# Patient Record
Sex: FEMALE | Race: BLACK OR AFRICAN AMERICAN | NOT HISPANIC OR LATINO | Employment: UNEMPLOYED | URBAN - METROPOLITAN AREA
[De-identification: names, ages, dates, MRNs, and addresses within clinical notes are randomized per-mention and may not be internally consistent; named-entity substitution may affect disease eponyms.]

---

## 2023-12-06 ENCOUNTER — APPOINTMENT (OUTPATIENT)
Dept: OBSTETRICS AND GYNECOLOGY | Facility: CLINIC | Age: 48
End: 2023-12-06
Payer: COMMERCIAL

## 2023-12-11 ENCOUNTER — APPOINTMENT (OUTPATIENT)
Dept: OBSTETRICS AND GYNECOLOGY | Facility: CLINIC | Age: 48
End: 2023-12-11
Payer: COMMERCIAL

## 2023-12-12 ENCOUNTER — OFFICE VISIT (OUTPATIENT)
Dept: OBSTETRICS AND GYNECOLOGY | Facility: CLINIC | Age: 48
End: 2023-12-12
Payer: COMMERCIAL

## 2023-12-12 ENCOUNTER — LAB (OUTPATIENT)
Dept: LAB | Facility: LAB | Age: 48
End: 2023-12-12
Payer: COMMERCIAL

## 2023-12-12 VITALS
SYSTOLIC BLOOD PRESSURE: 129 MMHG | BODY MASS INDEX: 41.1 KG/M2 | HEART RATE: 84 BPM | DIASTOLIC BLOOD PRESSURE: 80 MMHG | WEIGHT: 247 LBS

## 2023-12-12 DIAGNOSIS — Z01.419 WELL WOMAN EXAM WITH ROUTINE GYNECOLOGICAL EXAM: Primary | ICD-10-CM

## 2023-12-12 DIAGNOSIS — Z71.6 ENCOUNTER FOR SMOKING CESSATION COUNSELING: ICD-10-CM

## 2023-12-12 DIAGNOSIS — Z01.419 WELL WOMAN EXAM WITH ROUTINE GYNECOLOGICAL EXAM: ICD-10-CM

## 2023-12-12 DIAGNOSIS — N95.1 VAGINAL DRYNESS, MENOPAUSAL: ICD-10-CM

## 2023-12-12 PROBLEM — Z11.3 ENCOUNTER FOR SCREENING FOR INFECTIONS WITH PREDOMINANTLY SEXUAL MODE OF TRANSMISSION: Status: ACTIVE | Noted: 2023-12-12

## 2023-12-12 LAB
HBV SURFACE AG SERPL QL IA: NONREACTIVE
HCV AB SER QL: NONREACTIVE
HIV 1+2 AB+HIV1 P24 AG SERPL QL IA: NONREACTIVE
T PALLIDUM AB SER QL: NONREACTIVE

## 2023-12-12 PROCEDURE — 36415 COLL VENOUS BLD VENIPUNCTURE: CPT

## 2023-12-12 PROCEDURE — 87491 CHLMYD TRACH DNA AMP PROBE: CPT

## 2023-12-12 PROCEDURE — 86803 HEPATITIS C AB TEST: CPT

## 2023-12-12 PROCEDURE — 99396 PREV VISIT EST AGE 40-64: CPT

## 2023-12-12 PROCEDURE — 87591 N.GONORRHOEAE DNA AMP PROB: CPT

## 2023-12-12 PROCEDURE — 87205 SMEAR GRAM STAIN: CPT

## 2023-12-12 PROCEDURE — 87340 HEPATITIS B SURFACE AG IA: CPT

## 2023-12-12 PROCEDURE — 90471 IMMUNIZATION ADMIN: CPT | Mod: GC

## 2023-12-12 PROCEDURE — 87661 TRICHOMONAS VAGINALIS AMPLIF: CPT

## 2023-12-12 PROCEDURE — 99214 OFFICE O/P EST MOD 30 MIN: CPT | Mod: 25,GC

## 2023-12-12 PROCEDURE — 87389 HIV-1 AG W/HIV-1&-2 AB AG IA: CPT

## 2023-12-12 PROCEDURE — 86780 TREPONEMA PALLIDUM: CPT

## 2023-12-12 RX ORDER — ESTRADIOL 0.1 MG/G
CREAM VAGINAL
Qty: 34 G | Refills: 3 | Status: SHIPPED | OUTPATIENT
Start: 2023-12-12

## 2023-12-12 RX ORDER — MUPIROCIN 20 MG/G
OINTMENT TOPICAL
COMMUNITY
Start: 2020-11-05

## 2023-12-12 RX ORDER — SULFAMETHOXAZOLE AND TRIMETHOPRIM 800; 160 MG/1; MG/1
TABLET ORAL EVERY 12 HOURS
COMMUNITY
Start: 2023-09-14 | End: 2024-09-14

## 2023-12-12 RX ORDER — VARENICLINE TARTRATE 1 MG/1
1 TABLET, FILM COATED ORAL 2 TIMES DAILY
Qty: 60 TABLET | Refills: 2 | Status: SHIPPED | OUTPATIENT
Start: 2023-12-12 | End: 2024-03-12 | Stop reason: WASHOUT

## 2023-12-12 RX ORDER — DEXTROMETHORPHAN HYDROBROMIDE, GUAIFENESIN 5; 100 MG/5ML; MG/5ML
LIQUID ORAL
COMMUNITY

## 2023-12-12 RX ORDER — DEXTROSE MONOHYDRATE, SODIUM CHLORIDE, AND POTASSIUM CHLORIDE 50; 2.25; 2.24 G/1000ML; G/1000ML; G/1000ML
1 INJECTION, SOLUTION INTRAVENOUS DAILY
COMMUNITY

## 2023-12-12 RX ORDER — CHLORTHALIDONE 25 MG/1
25 TABLET ORAL DAILY
COMMUNITY

## 2023-12-12 RX ORDER — ATORVASTATIN CALCIUM 20 MG/1
TABLET, FILM COATED ORAL
COMMUNITY
Start: 2023-09-14 | End: 2024-09-12

## 2023-12-12 ASSESSMENT — ENCOUNTER SYMPTOMS
HEMATOLOGIC/LYMPHATIC NEGATIVE: 0
CARDIOVASCULAR NEGATIVE: 0
NEUROLOGICAL NEGATIVE: 0
PSYCHIATRIC NEGATIVE: 0
EYES NEGATIVE: 0
RESPIRATORY NEGATIVE: 0
GASTROINTESTINAL NEGATIVE: 0
MUSCULOSKELETAL NEGATIVE: 0
ENDOCRINE NEGATIVE: 0
ALLERGIC/IMMUNOLOGIC NEGATIVE: 0
CONSTITUTIONAL NEGATIVE: 0

## 2023-12-12 ASSESSMENT — PAIN SCALES - GENERAL: PAINLEVEL: 0-NO PAIN

## 2023-12-12 NOTE — ASSESSMENT & PLAN NOTE
- 1/2 ppd x 20 years, 10 pack-year history  - motivated to quit - chantix Rx'd  - 1800 QUIT NOW resource given   - plan to re-evaluate for HRT pending smoking cessation

## 2023-12-12 NOTE — PROGRESS NOTES
Sudha Ervin is a 48 y.o. female who is here for a routine exam. PCP = Dr. Bob ([facility])     HPI:   Reports vaginal dryness since hysterectomy 4 years ago, also with hot flashes. Sleep disturbances. Also reports frequent yeast infections.   Also worried about HS symptomatic in groin. Noticed one bump on L groin.     Past med hx and past surg hx reviewed and notable for:   - HTN, RA, hidradenitis suppurativa.   - S/p hysterectomy w/ oophorectomy (2019)   - 1x CS    OBHx: , 1x CS  GynHx: reports vaginal dryness, finds lube irritating to the skin. Surgical amenorrhea, reports h/o hysterectomy for AUB. Also reports h/o ovarian cysts, now s/p oophorectomy per patient.   Substance:  0.5 cigs/day  interested in quitting     Social History     Socioeconomic History    Marital status: Single     Spouse name: Not on file    Number of children: Not on file    Years of education: Not on file    Highest education level: Not on file   Occupational History    Not on file   Tobacco Use    Smoking status: Every Day     Packs/day: .5     Types: Cigarettes    Smokeless tobacco: Not on file   Substance and Sexual Activity    Alcohol use: Not on file    Drug use: Not on file    Sexual activity: Yes     Partners: Male     Birth control/protection: Surgical   Other Topics Concern    Not on file   Social History Narrative    Not on file     Social Determinants of Health     Financial Resource Strain: Not on file   Food Insecurity: Not on file   Transportation Needs: Not on file   Physical Activity: Not on file   Stress: Not on file   Social Connections: Not on file   Intimate Partner Violence: Not on file   Housing Stability: Not on file       Objective   /80   Pulse 84   Wt 112 kg (247 lb)   BMI 41.10 kg/m²      General:   Alert and oriented, in no acute distress   Neck: Supple. No visible thyromegaly.    Breast/Axilla: Normal to palpation bilaterally without masses, skin changes, or nipple discharge.    Abdomen: Soft,  non-tender, without masses or organomegaly   Vulva: Normal architecture without erythema, masses, or lesions. Somewhat prominent urethra. Small, 0.5cm nontender nodule on R lower vulva   Vagina: Pale-appearing mucosa without lesions, masses. Small amount of white discharge visible in vault.    Cervix: Surgically absent   Uterus: Surgically absent    Adnexa: Surgically absent   Pelvic Floor No POP noted.    Psych Normal affect. Normal mood.          No orders of the defined types were placed in this encounter.      Problem List Items Addressed This Visit       Well woman exam with routine gynecological exam - Primary    Current Assessment & Plan     - s/p hysterectomy and oophorectomy - pap not indicated  - endorsing menopausal symptoms - plan to reassess vasomotor treatment after smoking cessation  - mammogram ordered  - colonoscopy ordered         Relevant Orders    HIV 1/2 Antigen/Antibody Screen with Reflex to Confirmation    Syphilis Screen with Reflex    Hepatitis C Antibody    Hepatitis B Surface Antigen    Neisseria gonorrhoeae, Amplified    Chlamydia Trachomatis, Amplified    Trichomonas vaginalis, Nucleic Acid Detection    Vaginitis Gram Stain For Bacterial Vaginosis + Yeast    BI mammo bilateral screening tomosynthesis    Colonoscopy Screening; Average Risk Patient    Flu vaccine, high dose seasonal, preservative free    Encounter for smoking cessation counseling    Current Assessment & Plan     - 1/2 ppd x 20 years, 10 pack-year history  - motivated to quit - chantix Rx'd  - 1800 QUIT NOW resource given   - plan to re-evaluate for HRT pending smoking cessation          Relevant Medications    varenicline (Chantix) 1 mg tablet    Vaginal dryness, menopausal    Current Assessment & Plan     - estrogen cream Rx'd          Relevant Medications    estradiol (Estrace) 0.01 % (0.1 mg/gram) vaginal cream    Other Relevant Orders    Follow Up In Gynecology        Thank you for coming to your annual exam.     D/w  Dr. Antwan Sosa MD  PGY2, Obstetrics and Gynecology

## 2023-12-12 NOTE — ASSESSMENT & PLAN NOTE
- s/p hysterectomy and oophorectomy - pap not indicated  - likely folliculitis on vulvar exam - recommend f/u if increased size, drainage, or si/sx of infection   - vasomotor sx - plan to reassess for treatment after smoking cessation. Will initiate vaginal estrogen as below   - mammogram ordered  - colonoscopy ordered

## 2023-12-13 LAB
C TRACH RRNA SPEC QL NAA+PROBE: NEGATIVE
CLUE CELLS VAG LPF-#/AREA: PRESENT /[LPF]
N GONORRHOEA DNA SPEC QL PROBE+SIG AMP: NEGATIVE
NUGENT SCORE: 9
T VAGINALIS RRNA SPEC QL NAA+PROBE: NEGATIVE
YEAST VAG WET PREP-#/AREA: ABNORMAL

## 2023-12-17 DIAGNOSIS — N76.0 BV (BACTERIAL VAGINOSIS): Primary | ICD-10-CM

## 2023-12-17 DIAGNOSIS — B96.89 BV (BACTERIAL VAGINOSIS): Primary | ICD-10-CM

## 2023-12-17 RX ORDER — METRONIDAZOLE 500 MG/1
500 TABLET ORAL 2 TIMES DAILY
Qty: 14 TABLET | Refills: 0 | Status: SHIPPED | OUTPATIENT
Start: 2023-12-17 | End: 2023-12-24

## 2023-12-18 DIAGNOSIS — Z12.11 COLON CANCER SCREENING: Primary | ICD-10-CM

## 2023-12-18 RX ORDER — POLYETHYLENE GLYCOL 3350, SODIUM CHLORIDE, SODIUM BICARBONATE AND POTASSIUM CHLORIDE 420; 5.72; 11.2; 1.48 G/4L; G/4L; G/4L; G/4L
4000 POWDER, FOR SOLUTION ORAL ONCE
Qty: 4000 ML | Refills: 0 | Status: SHIPPED | OUTPATIENT
Start: 2023-12-18 | End: 2023-12-18

## 2023-12-19 ENCOUNTER — TELEPHONE (OUTPATIENT)
Dept: OBSTETRICS AND GYNECOLOGY | Facility: CLINIC | Age: 48
End: 2023-12-19
Payer: COMMERCIAL

## 2023-12-19 NOTE — TELEPHONE ENCOUNTER
----- Message from Susie Sosa MD sent at 12/17/2023  7:53 PM EST -----  Please inform this patient she has BV, and an Rx for flagyl has been sent to her pharmacy. Thanks!

## 2023-12-21 ENCOUNTER — HOSPITAL ENCOUNTER (OUTPATIENT)
Dept: RADIOLOGY | Facility: HOSPITAL | Age: 48
Discharge: HOME | End: 2023-12-21
Payer: COMMERCIAL

## 2023-12-21 DIAGNOSIS — Z01.419 WELL WOMAN EXAM WITH ROUTINE GYNECOLOGICAL EXAM: ICD-10-CM

## 2023-12-21 DIAGNOSIS — Z12.31 ENCOUNTER FOR SCREENING MAMMOGRAM FOR MALIGNANT NEOPLASM OF BREAST: ICD-10-CM

## 2023-12-21 PROCEDURE — 77063 BREAST TOMOSYNTHESIS BI: CPT | Performed by: RADIOLOGY

## 2023-12-21 PROCEDURE — 77067 SCR MAMMO BI INCL CAD: CPT | Performed by: RADIOLOGY

## 2023-12-21 PROCEDURE — 77067 SCR MAMMO BI INCL CAD: CPT

## 2024-03-12 ENCOUNTER — OFFICE VISIT (OUTPATIENT)
Dept: OBSTETRICS AND GYNECOLOGY | Facility: CLINIC | Age: 49
End: 2024-03-12
Payer: COMMERCIAL

## 2024-03-12 VITALS
BODY MASS INDEX: 39.81 KG/M2 | HEIGHT: 66 IN | HEART RATE: 84 BPM | DIASTOLIC BLOOD PRESSURE: 84 MMHG | SYSTOLIC BLOOD PRESSURE: 169 MMHG | WEIGHT: 247.7 LBS

## 2024-03-12 DIAGNOSIS — Z71.3 WEIGHT LOSS COUNSELING, ENCOUNTER FOR: ICD-10-CM

## 2024-03-12 DIAGNOSIS — Z71.6 ENCOUNTER FOR SMOKING CESSATION COUNSELING: ICD-10-CM

## 2024-03-12 DIAGNOSIS — N95.1 VAGINAL DRYNESS, MENOPAUSAL: Primary | ICD-10-CM

## 2024-03-12 DIAGNOSIS — E66.9 OBESITY (BMI 30-39.9): ICD-10-CM

## 2024-03-12 PROCEDURE — 99215 OFFICE O/P EST HI 40 MIN: CPT

## 2024-03-12 PROCEDURE — 99215 OFFICE O/P EST HI 40 MIN: CPT | Mod: GC

## 2024-03-12 RX ORDER — CYCLOBENZAPRINE HCL 10 MG
10 TABLET ORAL 3 TIMES DAILY
COMMUNITY
Start: 2024-02-27 | End: 2024-03-03 | Stop reason: WASHOUT

## 2024-03-12 RX ORDER — VARENICLINE TARTRATE 1 MG/1
1 TABLET, FILM COATED ORAL 2 TIMES DAILY
Qty: 60 TABLET | Refills: 2 | Status: SHIPPED | OUTPATIENT
Start: 2024-03-12 | End: 2024-06-10

## 2024-03-12 RX ORDER — DOCUSATE SODIUM 100 MG/1
100 CAPSULE, LIQUID FILLED ORAL EVERY 12 HOURS PRN
COMMUNITY
Start: 2024-02-27 | End: 2024-03-28

## 2024-03-12 ASSESSMENT — PAIN SCALES - GENERAL: PAINLEVEL: 3

## 2024-03-12 NOTE — ASSESSMENT & PLAN NOTE
- currently smoking 5 cigs/day   - reviewed benefits of smoking cessation, including in wound healing now that she is s/p surgery. Also reviewed safety of chantix, especially given she is having no adverse side effects.  - chantix Rx'd previously, pt reports has been helping her quit. Refill sent today.

## 2024-03-12 NOTE — ASSESSMENT & PLAN NOTE
- reviewed dietary recommendations, exercise recommendations  - pt currently walking for exercise  - [employer] referral given

## 2024-03-12 NOTE — PROGRESS NOTES
Subjective   Patient ID: Sudha Ervin is a 48 y.o. female who presents for Follow-up (Pt here to follow up from visit 12/23/Reports no vaginal issues or concerns at this time).  HPI    49yo F presenting for FUV     Since last visit 12/2023 -   - vaginal dryness - vag estrogen Rx'd. Was using a few times per week with improvement in symptoms. Not currently using due to ongoing perirectal abscess recovery.   - menopausal sx - reports hot flashes a few times per week, overall able to manage symptoms well.   - smoking cessation - chantix Rx'd. Pt reports assisted with cravings when she was taking, stopped taking because she was nervous to drive while taking chantix.     Pt just got out of hospital from appendectomy, perirectal abscess, recovering well. Pendrose drain to be removed tomorrow. Pain mostly well controlled.     Also interested in weight loss. Has tried fasting for weeks without weight loss. Has also been walking daily.    PMH notable for HTN, on hydrochlorothiazide; did not take meds today.       Objective   Visit Vitals  /84   Pulse 84      Physical Exam  General: no acute distress  HEENT: normocephalic, atraumatic  Heart: warm and well perfused  Lungs: no increased WOB  Abd: soft  Extremities: moving all extremities  Neuro: awake and conversant  Psych: appropriate mood and affect     Assessment/Plan   Problem List Items Addressed This Visit             ICD-10-CM    Encounter for smoking cessation counseling Z71.6     - currently smoking 5 cigs/day   - reviewed benefits of smoking cessation, including in wound healing now that she is s/p surgery. Also reviewed safety of chantix, especially given she is having no adverse side effects.  - chantix Rx'd previously, pt reports has been helping her quit. Refill sent today.         Relevant Medications    varenicline (Chantix) 1 mg tablet    Vaginal dryness, menopausal - Primary N95.1     - reviewed estrogen cream usage - improved symptoms. Plan to  continue taking  - reviewed additional menopausal symptoms and treatment options - pt declines at this time         Obesity (BMI 30-39.9) E66.9     - reviewed dietary recommendations, exercise recommendations  - pt currently walking for exercise  - [employer] referral given          Relevant Orders    Referral to [facility]    Weight loss counseling, encounter for Z71.3     RTC 1 yr for annual      Seen and d/w Dr. Antwna Sosa MD 03/12/24 9:15 AM

## 2024-03-12 NOTE — PROGRESS NOTES
I saw and examined the patient.  I personally obtained the key and critical portions of the history and physical exam, and was physically present for the key and critical portions performed by the resident.  I reviewed the resident's documentation and discussed the patient with the resident.  I agreed with the resident's medical decision-making as documented in the resident's note.  Corinne Bazella MD Corinne A Bazella, MD

## 2024-03-12 NOTE — ASSESSMENT & PLAN NOTE
- reviewed estrogen cream usage - improved symptoms. Plan to continue taking  - reviewed additional menopausal symptoms and treatment options - pt declines at this time

## 2024-09-19 ENCOUNTER — OFFICE VISIT (OUTPATIENT)
Dept: OBSTETRICS AND GYNECOLOGY | Facility: CLINIC | Age: 49
End: 2024-09-19
Payer: COMMERCIAL

## 2024-09-19 VITALS
HEART RATE: 84 BPM | SYSTOLIC BLOOD PRESSURE: 158 MMHG | WEIGHT: 247.3 LBS | BODY MASS INDEX: 41.2 KG/M2 | DIASTOLIC BLOOD PRESSURE: 81 MMHG | HEIGHT: 65 IN

## 2024-09-19 DIAGNOSIS — Z12.31 BREAST CANCER SCREENING BY MAMMOGRAM: ICD-10-CM

## 2024-09-19 DIAGNOSIS — N89.8 VAGINAL DISCHARGE: Primary | ICD-10-CM

## 2024-09-19 PROCEDURE — 3008F BODY MASS INDEX DOCD: CPT | Performed by: STUDENT IN AN ORGANIZED HEALTH CARE EDUCATION/TRAINING PROGRAM

## 2024-09-19 PROCEDURE — 99213 OFFICE O/P EST LOW 20 MIN: CPT | Mod: GC | Performed by: STUDENT IN AN ORGANIZED HEALTH CARE EDUCATION/TRAINING PROGRAM

## 2024-09-19 PROCEDURE — 87661 TRICHOMONAS VAGINALIS AMPLIF: CPT | Performed by: STUDENT IN AN ORGANIZED HEALTH CARE EDUCATION/TRAINING PROGRAM

## 2024-09-19 PROCEDURE — 99213 OFFICE O/P EST LOW 20 MIN: CPT | Performed by: STUDENT IN AN ORGANIZED HEALTH CARE EDUCATION/TRAINING PROGRAM

## 2024-09-19 PROCEDURE — 87491 CHLMYD TRACH DNA AMP PROBE: CPT | Performed by: STUDENT IN AN ORGANIZED HEALTH CARE EDUCATION/TRAINING PROGRAM

## 2024-09-19 PROCEDURE — 87205 SMEAR GRAM STAIN: CPT | Performed by: STUDENT IN AN ORGANIZED HEALTH CARE EDUCATION/TRAINING PROGRAM

## 2024-09-19 ASSESSMENT — ENCOUNTER SYMPTOMS
CONSTITUTIONAL NEGATIVE: 0
HEMATOLOGIC/LYMPHATIC NEGATIVE: 0
GASTROINTESTINAL NEGATIVE: 0
ENDOCRINE NEGATIVE: 0
CARDIOVASCULAR NEGATIVE: 0
PSYCHIATRIC NEGATIVE: 0
ALLERGIC/IMMUNOLOGIC NEGATIVE: 0
EYES NEGATIVE: 0
RESPIRATORY NEGATIVE: 0
MUSCULOSKELETAL NEGATIVE: 0
NEUROLOGICAL NEGATIVE: 0

## 2024-09-19 ASSESSMENT — PATIENT HEALTH QUESTIONNAIRE - PHQ9
SUM OF ALL RESPONSES TO PHQ9 QUESTIONS 1 AND 2: 0
1. LITTLE INTEREST OR PLEASURE IN DOING THINGS: NOT AT ALL
2. FEELING DOWN, DEPRESSED OR HOPELESS: NOT AT ALL

## 2024-09-19 ASSESSMENT — PAIN SCALES - GENERAL: PAINLEVEL: 0-NO PAIN

## 2024-09-19 NOTE — PROGRESS NOTES
I saw and evaluated the patient. I personally obtained the key and critical portions of the history and physical exam or was physically present for key and critical portions performed by the resident/fellow. I reviewed the resident/fellow's documentation and discussed the patient with the resident/fellow. I agree with the resident/fellow's medical decision making as documented in the note.    Keke Murguia MD

## 2024-09-19 NOTE — PROGRESS NOTES
OBGYN Annual Visit    Sudha Ervin is a 48 y.o.  presenting for vaginal discharge    HPI  Patient states that she got treated for a sprained ankle in the setting of her arthiritis and was given prednisone and since then she has had foul smelling discharge and some vulvar itching, otherwise no vb and is sp hysterectomy. She also takes vaginal estrogen for vaginal dryness which has improved her symptoms.    PMH notable for HTN, on hydrochlorothiazide     OB History    Para Term  AB Living   6 1       1   SAB IAB Ectopic Multiple Live Births                  # Outcome Date GA Lbr Alfred/2nd Weight Sex Type Anes PTL Lv   6             5             4             3             2             1 Para      CS-LTranv          Past Medical History:   Diagnosis Date    Hidradenitis suppurativa     Hypertension     Other specified acute skin changes due to ultraviolet radiation     Photodermatitis    Personal history of other diseases of the musculoskeletal system and connective tissue     History of rheumatoid arthritis    Sciatica, unspecified side     Sciatic pain       Past Surgical History:   Procedure Laterality Date    OTHER SURGICAL HISTORY  2021    Cholecystectomy    OTHER SURGICAL HISTORY  2019    Hysterectomy    OTHER SURGICAL HISTORY  2021    Ectopic pregnancy removal    OTHER SURGICAL HISTORY  2022    Esophagogastroduodenoscopy    OTHER SURGICAL HISTORY  2022    Colonoscopy       Family History   Problem Relation Name Age of Onset    Breast cancer Maternal Grandmother  40 - 49       Social History     Socioeconomic History    Marital status: Single     Spouse name: Not on file    Number of children: Not on file    Years of education: Not on file    Highest education level: Not on file   Occupational History    Not on file   Tobacco Use    Smoking status: Every Day     Current packs/day: 0.50     Types: Cigarettes    Smokeless tobacco: Never    Substance and Sexual Activity    Alcohol use: Yes     Comment: Social drinking    Drug use: Yes     Types: Marijuana    Sexual activity: Yes     Partners: Male     Birth control/protection: Surgical   Other Topics Concern    Not on file   Social History Narrative    Not on file     Social Determinants of Health     Financial Resource Strain: Low Risk  (2/26/2024)    Received from [facility]    Overall Financial Resource Strain (CARDIA)     Difficulty of Paying Living Expenses: Not very hard   Food Insecurity: No Food Insecurity (2/26/2024)    Received from [facility]    Hunger Vital Sign     Worried About Running Out of Food in the Last Year: Never true     Ran Out of Food in the Last Year: Never true   Transportation Needs: No Transportation Needs (2/26/2024)    Received from [facility]    PRAPARE - Transportation     Lack of Transportation (Medical): No     Lack of Transportation (Non-Medical): No   Physical Activity: Not on file   Stress: Not on file   Social Connections: Not on file   Intimate Partner Violence: Not on file   Housing Stability: Not on file         Objective   Visit Vitals  /81   Pulse 84         General:   Alert and oriented, in no acute distress   Neck: Supple. No visible thyromegaly.            Vulva: Normal architecture without erythema, masses, or lesions.    Vagina: Normal mucosa without lesions, masses, or atrophy. White and clumpy discharge in vault   Cervix: Surgically absent           Pelvic Floor No POP noted. No high tone pelvic floor    Psych Normal affect. Normal mood.      ASSESSEMENT AND PLAN    Vaginal Discharge  - white clumpy vaginal discharge resembling yeast but in setting of vaginal estrogen use will wait for swabs to come back  - GCCT and Trich also obtained per patient request     Preventative Care  - mammogram scheduled, last wnl 2023      Awais Zaidi, PGY4 OBGYN  Seen and discussed with   Blade

## 2024-09-20 LAB
C TRACH RRNA SPEC QL NAA+PROBE: NEGATIVE
N GONORRHOEA DNA SPEC QL PROBE+SIG AMP: NEGATIVE
T VAGINALIS RRNA SPEC QL NAA+PROBE: NEGATIVE

## 2024-09-23 DIAGNOSIS — B96.89 BACTERIAL VAGINOSIS: Primary | ICD-10-CM

## 2024-09-23 DIAGNOSIS — N76.0 BACTERIAL VAGINOSIS: Primary | ICD-10-CM

## 2024-09-23 LAB
CLUE CELLS VAG LPF-#/AREA: PRESENT /[LPF]
NUGENT SCORE: 9
YEAST VAG WET PREP-#/AREA: ABNORMAL

## 2024-09-23 RX ORDER — METRONIDAZOLE 7.5 MG/G
GEL VAGINAL DAILY
Qty: 70 G | Refills: 0 | Status: SHIPPED | OUTPATIENT
Start: 2024-09-23 | End: 2024-09-28

## 2025-06-11 ENCOUNTER — APPOINTMENT (OUTPATIENT)
Dept: RADIOLOGY | Facility: HOSPITAL | Age: 50
End: 2025-06-11
Payer: COMMERCIAL

## 2025-06-11 ENCOUNTER — HOSPITAL ENCOUNTER (INPATIENT)
Facility: HOSPITAL | Age: 50
LOS: 4 days | Discharge: HOME | End: 2025-06-15
Attending: STUDENT IN AN ORGANIZED HEALTH CARE EDUCATION/TRAINING PROGRAM | Admitting: INTERNAL MEDICINE
Payer: COMMERCIAL

## 2025-06-11 ENCOUNTER — CLINICAL SUPPORT (OUTPATIENT)
Dept: EMERGENCY MEDICINE | Facility: HOSPITAL | Age: 50
End: 2025-06-11
Payer: COMMERCIAL

## 2025-06-11 DIAGNOSIS — I10 HYPERTENSION, UNSPECIFIED TYPE: ICD-10-CM

## 2025-06-11 DIAGNOSIS — K59.00 CONSTIPATION, UNSPECIFIED CONSTIPATION TYPE: ICD-10-CM

## 2025-06-11 DIAGNOSIS — K85.90 ACUTE PANCREATITIS, UNSPECIFIED COMPLICATION STATUS, UNSPECIFIED PANCREATITIS TYPE (HHS-HCC): Primary | ICD-10-CM

## 2025-06-11 DIAGNOSIS — R11.2 NAUSEA AND VOMITING, UNSPECIFIED VOMITING TYPE: ICD-10-CM

## 2025-06-11 LAB
ALBUMIN SERPL BCP-MCNC: 3.7 G/DL (ref 3.4–5)
ALP SERPL-CCNC: 73 U/L (ref 33–110)
ALT SERPL W P-5'-P-CCNC: 10 U/L (ref 7–45)
ANION GAP SERPL CALC-SCNC: 14 MMOL/L (ref 10–20)
APPEARANCE UR: CLEAR
AST SERPL W P-5'-P-CCNC: 11 U/L (ref 9–39)
BASOPHILS # BLD AUTO: 0.03 X10*3/UL (ref 0–0.1)
BASOPHILS NFR BLD AUTO: 0.5 %
BILIRUB SERPL-MCNC: 0.5 MG/DL (ref 0–1.2)
BILIRUB UR STRIP.AUTO-MCNC: NEGATIVE MG/DL
BUN SERPL-MCNC: 6 MG/DL (ref 6–23)
CALCIUM SERPL-MCNC: 9 MG/DL (ref 8.6–10.6)
CARDIAC TROPONIN I PNL SERPL HS: 8 NG/L (ref 0–34)
CHLORIDE SERPL-SCNC: 100 MMOL/L (ref 98–107)
CHOLEST SERPL-MCNC: 234 MG/DL (ref 0–199)
CHOLESTEROL/HDL RATIO: 4.6
CO2 SERPL-SCNC: 27 MMOL/L (ref 21–32)
COLOR UR: ABNORMAL
CREAT SERPL-MCNC: 0.75 MG/DL (ref 0.5–1.05)
EGFRCR SERPLBLD CKD-EPI 2021: >90 ML/MIN/1.73M*2
EOSINOPHIL # BLD AUTO: 0.1 X10*3/UL (ref 0–0.7)
EOSINOPHIL NFR BLD AUTO: 1.5 %
ERYTHROCYTE [DISTWIDTH] IN BLOOD BY AUTOMATED COUNT: 13.4 % (ref 11.5–14.5)
ETHANOL SERPL-MCNC: <10 MG/DL
GLUCOSE SERPL-MCNC: 103 MG/DL (ref 74–99)
GLUCOSE UR STRIP.AUTO-MCNC: NORMAL MG/DL
HCT VFR BLD AUTO: 41.2 % (ref 36–46)
HDLC SERPL-MCNC: 50.8 MG/DL
HGB BLD-MCNC: 14.3 G/DL (ref 12–16)
IMM GRANULOCYTES # BLD AUTO: 0.02 X10*3/UL (ref 0–0.7)
IMM GRANULOCYTES NFR BLD AUTO: 0.3 % (ref 0–0.9)
KETONES UR STRIP.AUTO-MCNC: NEGATIVE MG/DL
LACTATE SERPL-SCNC: 0.9 MMOL/L (ref 0.4–2)
LDLC SERPL CALC-MCNC: 159 MG/DL
LEUKOCYTE ESTERASE UR QL STRIP.AUTO: NEGATIVE
LIPASE SERPL-CCNC: 72 U/L (ref 9–82)
LYMPHOCYTES # BLD AUTO: 2.52 X10*3/UL (ref 1.2–4.8)
LYMPHOCYTES NFR BLD AUTO: 38.9 %
MCH RBC QN AUTO: 30.6 PG (ref 26–34)
MCHC RBC AUTO-ENTMCNC: 34.7 G/DL (ref 32–36)
MCV RBC AUTO: 88 FL (ref 80–100)
MONOCYTES # BLD AUTO: 0.59 X10*3/UL (ref 0.1–1)
MONOCYTES NFR BLD AUTO: 9.1 %
NEUTROPHILS # BLD AUTO: 3.21 X10*3/UL (ref 1.2–7.7)
NEUTROPHILS NFR BLD AUTO: 49.7 %
NITRITE UR QL STRIP.AUTO: NEGATIVE
NON HDL CHOLESTEROL: 183 MG/DL (ref 0–149)
NRBC BLD-RTO: 0 /100 WBCS (ref 0–0)
PH UR STRIP.AUTO: 7 [PH]
PLATELET # BLD AUTO: 304 X10*3/UL (ref 150–450)
POTASSIUM SERPL-SCNC: 3.4 MMOL/L (ref 3.5–5.3)
PREGNANCY TEST URINE, POC: NEGATIVE
PROT SERPL-MCNC: 7.9 G/DL (ref 6.4–8.2)
PROT UR STRIP.AUTO-MCNC: ABNORMAL MG/DL
RBC # BLD AUTO: 4.67 X10*6/UL (ref 4–5.2)
RBC # UR STRIP.AUTO: NEGATIVE MG/DL
RBC #/AREA URNS AUTO: NORMAL /HPF
SODIUM SERPL-SCNC: 138 MMOL/L (ref 136–145)
SP GR UR STRIP.AUTO: >1.05
SQUAMOUS #/AREA URNS AUTO: NORMAL /HPF
TRIGL SERPL-MCNC: 123 MG/DL (ref 0–149)
UROBILINOGEN UR STRIP.AUTO-MCNC: NORMAL MG/DL
VLDL: 25 MG/DL (ref 0–40)
WBC # BLD AUTO: 6.5 X10*3/UL (ref 4.4–11.3)
WBC #/AREA URNS AUTO: NORMAL /HPF

## 2025-06-11 PROCEDURE — 81025 URINE PREGNANCY TEST: CPT

## 2025-06-11 PROCEDURE — 71046 X-RAY EXAM CHEST 2 VIEWS: CPT

## 2025-06-11 PROCEDURE — 99223 1ST HOSP IP/OBS HIGH 75: CPT

## 2025-06-11 PROCEDURE — 96374 THER/PROPH/DIAG INJ IV PUSH: CPT

## 2025-06-11 PROCEDURE — 2500000001 HC RX 250 WO HCPCS SELF ADMINISTERED DRUGS (ALT 637 FOR MEDICARE OP): Mod: SE

## 2025-06-11 PROCEDURE — 93010 ELECTROCARDIOGRAM REPORT: CPT

## 2025-06-11 PROCEDURE — 2550000001 HC RX 255 CONTRASTS: Mod: SE

## 2025-06-11 PROCEDURE — 99285 EMERGENCY DEPT VISIT HI MDM: CPT

## 2025-06-11 PROCEDURE — 80061 LIPID PANEL: CPT

## 2025-06-11 PROCEDURE — 96375 TX/PRO/DX INJ NEW DRUG ADDON: CPT

## 2025-06-11 PROCEDURE — 99285 EMERGENCY DEPT VISIT HI MDM: CPT | Mod: 25 | Performed by: STUDENT IN AN ORGANIZED HEALTH CARE EDUCATION/TRAINING PROGRAM

## 2025-06-11 PROCEDURE — 2500000004 HC RX 250 GENERAL PHARMACY W/ HCPCS (ALT 636 FOR OP/ED): Mod: SE

## 2025-06-11 PROCEDURE — 1210000001 HC SEMI-PRIVATE ROOM DAILY

## 2025-06-11 PROCEDURE — 71046 X-RAY EXAM CHEST 2 VIEWS: CPT | Performed by: RADIOLOGY

## 2025-06-11 PROCEDURE — 84484 ASSAY OF TROPONIN QUANT: CPT

## 2025-06-11 PROCEDURE — 93005 ELECTROCARDIOGRAM TRACING: CPT

## 2025-06-11 PROCEDURE — 81001 URINALYSIS AUTO W/SCOPE: CPT

## 2025-06-11 PROCEDURE — 85025 COMPLETE CBC W/AUTO DIFF WBC: CPT

## 2025-06-11 PROCEDURE — 74177 CT ABD & PELVIS W/CONTRAST: CPT | Mod: FOREIGN READ | Performed by: RADIOLOGY

## 2025-06-11 PROCEDURE — 36415 COLL VENOUS BLD VENIPUNCTURE: CPT

## 2025-06-11 PROCEDURE — 80053 COMPREHEN METABOLIC PANEL: CPT

## 2025-06-11 PROCEDURE — 74177 CT ABD & PELVIS W/CONTRAST: CPT

## 2025-06-11 PROCEDURE — 83690 ASSAY OF LIPASE: CPT

## 2025-06-11 PROCEDURE — 83605 ASSAY OF LACTIC ACID: CPT

## 2025-06-11 PROCEDURE — 82077 ASSAY SPEC XCP UR&BREATH IA: CPT

## 2025-06-11 RX ORDER — ONDANSETRON HYDROCHLORIDE 2 MG/ML
4 INJECTION, SOLUTION INTRAVENOUS EVERY 8 HOURS PRN
Status: DISCONTINUED | OUTPATIENT
Start: 2025-06-11 | End: 2025-06-15 | Stop reason: HOSPADM

## 2025-06-11 RX ORDER — MORPHINE SULFATE 4 MG/ML
4 INJECTION INTRAVENOUS ONCE
Status: COMPLETED | OUTPATIENT
Start: 2025-06-11 | End: 2025-06-11

## 2025-06-11 RX ORDER — ESOMEPRAZOLE MAGNESIUM 40 MG/1
40 GRANULE, DELAYED RELEASE ORAL
Status: DISCONTINUED | OUTPATIENT
Start: 2025-06-12 | End: 2025-06-12

## 2025-06-11 RX ORDER — SODIUM CHLORIDE 9 MG/ML
100 INJECTION, SOLUTION INTRAVENOUS CONTINUOUS
Status: DISCONTINUED | OUTPATIENT
Start: 2025-06-11 | End: 2025-06-12

## 2025-06-11 RX ORDER — ACETAMINOPHEN 160 MG/5ML
650 SOLUTION ORAL EVERY 4 HOURS PRN
Status: DISCONTINUED | OUTPATIENT
Start: 2025-06-11 | End: 2025-06-12

## 2025-06-11 RX ORDER — ENOXAPARIN SODIUM 100 MG/ML
40 INJECTION SUBCUTANEOUS EVERY 12 HOURS SCHEDULED
Status: DISCONTINUED | OUTPATIENT
Start: 2025-06-11 | End: 2025-06-15 | Stop reason: HOSPADM

## 2025-06-11 RX ORDER — FAMOTIDINE 10 MG/ML
20 INJECTION, SOLUTION INTRAVENOUS EVERY 12 HOURS PRN
Status: DISCONTINUED | OUTPATIENT
Start: 2025-06-11 | End: 2025-06-12

## 2025-06-11 RX ORDER — ONDANSETRON HYDROCHLORIDE 2 MG/ML
4 INJECTION, SOLUTION INTRAVENOUS ONCE
Status: COMPLETED | OUTPATIENT
Start: 2025-06-11 | End: 2025-06-11

## 2025-06-11 RX ORDER — ONDANSETRON 4 MG/1
4 TABLET, ORALLY DISINTEGRATING ORAL EVERY 8 HOURS PRN
Status: DISCONTINUED | OUTPATIENT
Start: 2025-06-11 | End: 2025-06-15 | Stop reason: HOSPADM

## 2025-06-11 RX ORDER — ATORVASTATIN CALCIUM 20 MG/1
20 TABLET, FILM COATED ORAL NIGHTLY
Status: DISCONTINUED | OUTPATIENT
Start: 2025-06-11 | End: 2025-06-11

## 2025-06-11 RX ORDER — PANTOPRAZOLE SODIUM 40 MG/1
40 TABLET, DELAYED RELEASE ORAL
COMMUNITY

## 2025-06-11 RX ORDER — POTASSIUM CHLORIDE 1.5 G/1.58G
20 POWDER, FOR SOLUTION ORAL DAILY
Status: ON HOLD | COMMUNITY
End: 2025-06-12 | Stop reason: WASHOUT

## 2025-06-11 RX ORDER — ACETAMINOPHEN 650 MG/1
650 SUPPOSITORY RECTAL EVERY 4 HOURS PRN
Status: DISCONTINUED | OUTPATIENT
Start: 2025-06-11 | End: 2025-06-12

## 2025-06-11 RX ORDER — QUETIAPINE FUMARATE 50 MG/1
50 TABLET, FILM COATED ORAL 2 TIMES DAILY
COMMUNITY

## 2025-06-11 RX ORDER — POTASSIUM CHLORIDE 14.9 MG/ML
20 INJECTION INTRAVENOUS
Status: COMPLETED | OUTPATIENT
Start: 2025-06-11 | End: 2025-06-11

## 2025-06-11 RX ORDER — OXYCODONE HYDROCHLORIDE 5 MG/1
5 TABLET ORAL EVERY 6 HOURS PRN
Status: DISCONTINUED | OUTPATIENT
Start: 2025-06-11 | End: 2025-06-12

## 2025-06-11 RX ORDER — ACETAMINOPHEN 325 MG/1
650 TABLET ORAL EVERY 4 HOURS PRN
Status: DISCONTINUED | OUTPATIENT
Start: 2025-06-11 | End: 2025-06-12

## 2025-06-11 RX ORDER — CHLORTHALIDONE 25 MG/1
25 TABLET ORAL DAILY
Status: DISCONTINUED | OUTPATIENT
Start: 2025-06-11 | End: 2025-06-11

## 2025-06-11 RX ORDER — FAMOTIDINE 10 MG/ML
20 INJECTION, SOLUTION INTRAVENOUS ONCE
Status: COMPLETED | OUTPATIENT
Start: 2025-06-11 | End: 2025-06-11

## 2025-06-11 RX ORDER — HYDROCHLOROTHIAZIDE 25 MG/1
25 TABLET ORAL DAILY
COMMUNITY
End: 2025-06-15 | Stop reason: HOSPADM

## 2025-06-11 RX ORDER — PANTOPRAZOLE SODIUM 40 MG/1
40 TABLET, DELAYED RELEASE ORAL
Status: DISCONTINUED | OUTPATIENT
Start: 2025-06-12 | End: 2025-06-15 | Stop reason: HOSPADM

## 2025-06-11 RX ORDER — PANTOPRAZOLE SODIUM 40 MG/10ML
40 INJECTION, POWDER, LYOPHILIZED, FOR SOLUTION INTRAVENOUS
Status: DISCONTINUED | OUTPATIENT
Start: 2025-06-12 | End: 2025-06-12

## 2025-06-11 RX ADMIN — ONDANSETRON 4 MG: 2 INJECTION INTRAMUSCULAR; INTRAVENOUS at 08:25

## 2025-06-11 RX ADMIN — POTASSIUM CHLORIDE 20 MEQ: 14.9 INJECTION, SOLUTION INTRAVENOUS at 16:59

## 2025-06-11 RX ADMIN — ONDANSETRON 4 MG: 4 TABLET, ORALLY DISINTEGRATING ORAL at 18:28

## 2025-06-11 RX ADMIN — FAMOTIDINE 20 MG: 10 INJECTION INTRAVENOUS at 08:25

## 2025-06-11 RX ADMIN — SODIUM CHLORIDE 100 ML/HR: 0.9 INJECTION, SOLUTION INTRAVENOUS at 15:13

## 2025-06-11 RX ADMIN — POTASSIUM CHLORIDE 20 MEQ: 14.9 INJECTION, SOLUTION INTRAVENOUS at 15:13

## 2025-06-11 RX ADMIN — OXYCODONE 5 MG: 5 TABLET ORAL at 18:28

## 2025-06-11 RX ADMIN — IOHEXOL 80 ML: 350 INJECTION, SOLUTION INTRAVENOUS at 12:46

## 2025-06-11 RX ADMIN — ENOXAPARIN SODIUM 40 MG: 100 INJECTION SUBCUTANEOUS at 15:46

## 2025-06-11 RX ADMIN — MORPHINE SULFATE 4 MG: 4 INJECTION INTRAVENOUS at 14:05

## 2025-06-11 ASSESSMENT — PAIN SCALES - GENERAL
PAINLEVEL_OUTOF10: 7
PAINLEVEL_OUTOF10: 10 - WORST POSSIBLE PAIN
PAINLEVEL_OUTOF10: 9
PAINLEVEL_OUTOF10: 0 - NO PAIN
PAINLEVEL_OUTOF10: 10 - WORST POSSIBLE PAIN

## 2025-06-11 ASSESSMENT — PAIN - FUNCTIONAL ASSESSMENT
PAIN_FUNCTIONAL_ASSESSMENT: 0-10

## 2025-06-11 ASSESSMENT — PAIN DESCRIPTION - DESCRIPTORS
DESCRIPTORS: ACHING
DESCRIPTORS: ACHING

## 2025-06-11 ASSESSMENT — PAIN DESCRIPTION - ORIENTATION: ORIENTATION: ANTERIOR

## 2025-06-11 ASSESSMENT — PAIN DESCRIPTION - PAIN TYPE: TYPE: ACUTE PAIN

## 2025-06-11 ASSESSMENT — PAIN DESCRIPTION - LOCATION: LOCATION: CHEST

## 2025-06-11 NOTE — PROGRESS NOTES
Pharmacy Medication History Review    Sudha Ervin is a 49 y.o. female admitted for Acute pancreatitis, unspecified complication status, unspecified pancreatitis type ([facility]-HCC). Pharmacy reviewed the patient's [id number] medications and allergies for accuracy.    The list below reflects the updated PTA list.   Prior to Admission Medications   Prescriptions Last Dose Informant   QUEtiapine (SEROquel) 50 mg tablet Past Week Self   Sig: Take 1 tablet (50 mg) by mouth 2 times a day.   acetaminophen (Tylenol 8 HOUR) 650 mg ER tablet 6/10/2025 Self   Sig: Take 2 tablets (1,300 mg) by mouth every 8 hours if needed for mild pain (1 - 3) or moderate pain (4 - 6).   atorvastatin (Lipitor) 10 mg tablet Not Taking    Sig: Take 1 tablet (10 mg) by mouth once daily.   Patient not taking: Reported on 6/11/2025   chlorthalidone (Hygroton) 25 mg tablet Past Week Self   Sig: Take 1 tablet (25 mg) by mouth once daily.   estradiol (Estrace) 0.01 % (0.1 mg/gram) vaginal cream Not Taking Self   Sig: Use 1g nightly for two weeks. Then continue to use at bedtime twice a week   Patient not taking: Reported on 6/11/2025   hydroCHLOROthiazide (HYDRODiuril) 25 mg tablet Not Taking Self   Sig: Take 1 tablet (25 mg) by mouth once daily.   Patient not taking: Reported on 6/11/2025   linaCLOtide (Linzess) 145 mcg capsule Past Week Self   Sig: Take 1 capsule (145 mcg) by mouth once daily in the morning. Take before meals. At 6 AM   pantoprazole (ProtoNix) 40 mg EC tablet Past Week Self   Sig: Take 1 tablet (40 mg) by mouth once daily in the morning. Take before meals. Do not crush, chew, or split.   potassium chloride (Klor-Con) 20 mEq packet Not Taking Self   Sig: Take 20 mEq by mouth once daily.   Patient not taking: Reported on 6/11/2025   varenicline (Chantix) 1 mg tablet Not Taking    Sig: Take 1 tablet (1 mg) by mouth 2 times a day. Take with a full glass of water   Patient not taking: Reported on 6/11/2025       Facility-Administered Medications: None        The list below reflects the updated allergy list. Please review each documented allergy for additional clarification and justification.  Allergies  Reviewed by Sedrick Steven IV RN on 6/11/2025        Severity Reactions Comments    Clindamycin Not Specified Swelling, Unknown             Patient accepts M2B at discharge. Pharmacy has been updated to [facility].    Sources used to complete the med history include:    [facility]  Pharmacy dispense history  Patient Interview Good historian  Chart Review  Care Everywhere   Office visit 4/15/25 GI Shira Maria C    Below are additional concerns with the patient's PTA list.      Medications ADDED:  Linzess     Medications CHANGED:  None   Medications REMOVED:   Atorvastatin (not taking)  Estradiol (not taking)  Hydrochlorothiazide (not taking)  Potassium (not taking)  Chantix (not taking)    [facility]el[facility].   Transitions of Care Pharmacist    Please reach out via Secure Chat for questions, or if no response call [facility] or [facility]

## 2025-06-11 NOTE — H&P
History Of Present Illness  Sudha Ervin is a 49 y.o. female presenting with PMMH of HTN, GERD, IBS, [place], presented today complaining of epigastic abdominal pain radiate to the the back, the pain started after the patient was sick 2 weeks ago, she started to have flu like symptoms two weeks ago when the pain started. The pain is sever and associated with nausea and vomiting, pt reported chronic abdominal pain in the RLQ this pain is not the same, pt with hx of appendectomy a year ago,cholecystectomy 10 years ago. In the ED HDS, labs showed normal bilirubin, normal LFTs, no leukocytosis. Pt reported chest pain, troponin was negative and EKG NSR with no ischemic changes. Chest xray is negative for acute cardiopulmonary process and CT scan showed pancreatitis. Patient was admitted to medicine for further evaluation and management.       3/2022 EGD   Impression:            - Normal hypopharynx.                          - Normal esophagus.                          - Z-line regular, 40 cm from the incisors.                          - A large amount of food (residue) in the stomach.                          - Gastritis. Biopsied.                          - Retained food in the duodenum.      3/2022 Colonoscopy   Impression:            - Preparation of the colon was inadequate.                          - Hemorrhoids found on perianal exam.                          - Perianal skin tags found on perianal exam.                          - Stool in the rectum and in the sigmoid colon.                          - No specimens collected.       SHELLY pt was seen by GI on 4/15 for the abdominal pain (RLQ with constipation)   Was prescribed One time dose of Magnesium Citrate 10 oz, followed by 8 oz water, Start Linzess 145 mcg once daily for constipation, Start pantoprazole 40 mg once daily and was scheduled for EGD and colonoscopy.       Past Medical History  She has a past medical history of Hidradenitis suppurativa,  Hypertension, Other specified acute skin changes due to ultraviolet radiation, Personal history of other diseases of the musculoskeletal system and connective tissue, and Sciatica, unspecified side.    Surgical History  She has a past surgical history that includes Other surgical history (07/28/2021); Other surgical history (2019); Other surgical history (07/28/2021); Other surgical history (01/06/2022); and Other surgical history (01/06/2022).     Social History  She reports that she has been smoking cigarettes. She has never used smokeless tobacco. She reports current alcohol use. She reports current drug use. Drug: Marijuana.    Family History  Family History[1]     Allergies  Clindamycin    Review of Systems   Per HPI  Physical Exam   Constitutional: Patient does not appear to be in any acute distress, AOx4  HEENT: NCAT, normal external inspection of ears and nose. Oropharynx normal.  Cardio: RRR, S1/S2, no murmurs, rubs, or gallops, radial pulses +2, no edema of extremities  Pulm: CTAB, no respiratory distress.  GI:epigastric tenderness    MSK: No joint swelling, normal movements of all extremities. Normal ROM, 5/5 strength  Skin: No lesions, contusions, or erythema.  Extremities: no BLE swelling   Psych: Appropriate mood and behavior    Last Recorded Vitals  /65 (BP Location: Left arm, Patient Position: Sitting)   Pulse 65   Temp 36.8 °C (98.2 °F) (Tympanic)   Resp 18   Wt 112 kg (247 lb)   SpO2 98%     Relevant Results  Results for orders placed or performed during the hospital encounter of 06/11/25 (from the past 24 hours)   CBC and Auto Differential   Result Value Ref Range    WBC 6.5 4.4 - 11.3 x10*3/uL    nRBC 0.0 0.0 - 0.0 /100 WBCs    RBC 4.67 4.00 - 5.20 x10*6/uL    Hemoglobin 14.3 12.0 - 16.0 g/dL    Hematocrit 41.2 36.0 - 46.0 %    MCV 88 80 - 100 fL    MCH 30.6 26.0 - 34.0 pg    MCHC 34.7 32.0 - 36.0 g/dL    RDW 13.4 11.5 - 14.5 %    Platelets 304 150 - 450 x10*3/uL    Neutrophils % 49.7  40.0 - 80.0 %    Immature Granulocytes %, Automated 0.3 0.0 - 0.9 %    Lymphocytes % 38.9 13.0 - 44.0 %    Monocytes % 9.1 2.0 - 10.0 %    Eosinophils % 1.5 0.0 - 6.0 %    Basophils % 0.5 0.0 - 2.0 %    Neutrophils Absolute 3.21 1.20 - 7.70 x10*3/uL    Immature Granulocytes Absolute, Automated 0.02 0.00 - 0.70 x10*3/uL    Lymphocytes Absolute 2.52 1.20 - 4.80 x10*3/uL    Monocytes Absolute 0.59 0.10 - 1.00 x10*3/uL    Eosinophils Absolute 0.10 0.00 - 0.70 x10*3/uL    Basophils Absolute 0.03 0.00 - 0.10 x10*3/uL   Comprehensive metabolic panel   Result Value Ref Range    Glucose 103 (H) 74 - 99 mg/dL    Sodium 138 136 - 145 mmol/L    Potassium 3.4 (L) 3.5 - 5.3 mmol/L    Chloride 100 98 - 107 mmol/L    Bicarbonate 27 21 - 32 mmol/L    Anion Gap 14 10 - 20 mmol/L    Urea Nitrogen 6 6 - 23 mg/dL    Creatinine 0.75 0.50 - 1.05 mg/dL    eGFR >90 >60 mL/min/1.73m*2    Calcium 9.0 8.6 - 10.6 mg/dL    Albumin 3.7 3.4 - 5.0 g/dL    Alkaline Phosphatase 73 33 - 110 U/L    Total Protein 7.9 6.4 - 8.2 g/dL    AST 11 9 - 39 U/L    Bilirubin, Total 0.5 0.0 - 1.2 mg/dL    ALT 10 7 - 45 U/L   Lipase   Result Value Ref Range    Lipase 72 9 - 82 U/L   Lactate   Result Value Ref Range    Lactate 0.9 0.4 - 2.0 mmol/L   Troponin I, High Sensitivity   Result Value Ref Range    Troponin I, High Sensitivity (CMC) 8 0 - 34 ng/L             Assessment/Plan   49 y.o. female presenting with PMMH of HTN, GERD, IBS, [place], presented today complaining of two weeks of epigastric abdominal pain radiate to the the back, associated with nausea and vomiting, pt reported chronic abdominal pain in the RLQ this pain is not the same, pt with hx of appendectomy a year ago,cholecystectomy 10 years ago. In the ED HDS, labs showed normal bilirubin, normal LFTs, no leukocytosis. Pt reported chest pain, troponin was negative and EKG NSR with no ischemic changes. Chest xray is negative for acute cardiopulmonary process and CT scan showed pancreatitis. Patient was  admitted to medicine fas a case of acute pancreatitis.     #Acute pancreatitis   #Acute on chronic abdominal pain   #Nausea and vomiting   #Irritable bowel syndrome  #GERD   -pt vitally stable  -labs with normal bilirubin and LFTS ,lipase normal  -calcium is normal   -pt s/p cholecystectomy~10 years ago   -pt reported chronic abdominal pain diagnosed with IBS. No improvement in her pain, GI scheduled EGD and colonoscopy   -pt reported that she does not drink alcohol regularly   -no clear source for pancreatitis   Plan  -admit to medicine   -IV fluid  ml/hr  -Lipid profile pending   -hold chlorthalidone given it can cause pancreatitis (rare side effect and the patient on this medications since 2018) however given no clear reason for the pancreatitis will hold for now  -hold atorvastatin   -famotidine and Zofran for nausea  -pantoprazole for GERD symptoms   -tylenol and oxycodone PRN for pain  -no antibiotics, no fever or leukocytosis   -consider GI/rheumatology consult inpatient vs outpatient pt reported some joint pain.   -blood alcohol pending     F:PRN  E:PRN  N:advance as tolerated  A:PIV  Code status: Full code       Assessment & Plan  Acute pancreatitis, unspecified complication status, unspecified pancreatitis type ([facility])           Viraj Ford MD         [1]   Family History  Problem Relation Name Age of Onset    Breast cancer Maternal Grandmother  40 - 49

## 2025-06-11 NOTE — ED TRIAGE NOTES
Pt presents to the ED for complaints of flu like symptoms that started about 2 weeks ago. Pt states she had a viral illness about 2 weeks and states she has no felt any better since then. Pt states that she has developed central chest pain 4 days ago and that this pain now radiated to her back. Pt states she is now also unable to keep anything down and also has a productive cough. Pt has PMHX of rheumatoid arthritis and IBS. Pt states she also has a history of HTN but hasn't been able to take her medicine in about a week because she is unable to keep anything down

## 2025-06-11 NOTE — ED PROVIDER NOTES
HPI   Chief Complaint   Patient presents with    Flu Symptoms       49-year-old female PMH HTN, GERD, IBS, BARRINGTON presents ED for chief complaints of flulike symptoms.  Patient reports that around 2 weeks ago they had a viral URI states that they do not feel any better since this occurred.  Also reports having chest pain that developed around 4 days ago.  Also reporting productive cough as well as nausea, vomiting.  Reports that her chest pain is worsened with cough.    Denies any sore throat, runny nose, congestion, urinary symptoms, hematemesis, hemoptysis, hematochezia.              Patient History   Medical History[1]  Surgical History[2]  Family History[3]  Social History[4]    Physical Exam   ED Triage Vitals   Temperature Heart Rate Respirations BP   06/11/25 0756 06/11/25 0756 06/11/25 0756 06/11/25 0800   36.8 °C (98.2 °F) 68 18 (!) 178/95      Pulse Ox Temp Source Heart Rate Source Patient Position   06/11/25 0756 06/11/25 0756 06/11/25 0756 06/11/25 0756   97 % Tympanic Monitor Sitting      BP Location FiO2 (%)     06/11/25 0756 --     Left arm        Physical Exam  Vitals and nursing note reviewed.   Constitutional:       General: She is not in acute distress.     Appearance: Normal appearance. She is normal weight. She is not ill-appearing or toxic-appearing.   HENT:      Head: Normocephalic and atraumatic.   Cardiovascular:      Rate and Rhythm: Normal rate and regular rhythm.      Heart sounds: Normal heart sounds. No murmur heard.     No friction rub. No gallop.   Pulmonary:      Effort: No respiratory distress.      Breath sounds: Normal breath sounds. No stridor. No wheezing, rhonchi or rales.   Chest:      Chest wall: Tenderness present.   Abdominal:      General: Abdomen is flat. There is no distension.      Palpations: Abdomen is soft. There is no mass.      Tenderness: There is abdominal tenderness. There is no guarding or rebound.      Hernia: No hernia is present.   Neurological:      Mental  Status: She is alert.           ED Course & MDM   Diagnoses as of 06/11/25 1436   Acute pancreatitis, unspecified complication status, unspecified pancreatitis type ([facility])   Nausea and vomiting, unspecified vomiting type                 No data recorded     Kelly Coma Scale Score: 15 (06/11/25 0754 : Sedrick Steven IV, RN)                           Medical Decision Making  49-year-old female presents ED chief complaints of nausea, vomiting, abdominal pain, cough, shortness of breath, chest pain.  Reports that she had a URI about 2 weeks ago but still not feeling any better.  On exam has reproducible chest pain.  Reports her chest pain is worsened with deep breathing.  Also midepigastric as well as diffuse abdominal tenderness on exam.  Vital signs however are reassuring.  Given patient's abdominal tenderness, chest pain will obtain labs as well as chest x-ray, EKG, CT abdomen pelvis.  Patient will be medicated for pain here in the emergency department and reevaluated.    2:36 PM Labs overall unremarkable apart from mild hypokalemia 3.4.  Remainder of electrolytes within normal limits.  Lipase, troponin and lactate within normal limits.  CBC no significant leukocytosis or chest x-ray unremarkable.    However, CT scan did show findings consistent with mild acute pancreatitis involving the head and uncinate process of the pancreas.  Given that this patient has a history of cholecystectomy and that they still report that they do not regularly drink alcohol and given that their lipase is within normal limits unclear etiology as to cause of pancreatitis at this time.  Patient reports minimal symptomatic improvement throughout the ED however, does not report any further episodes of emesis.    Given this finding to speak with admission service regarding this patient who did accept this patient.  Patient admitted to medicine service for further management and monitoring.    6:57 PM patient signed out to EAN  Jeanine pending transfer to admission floor.    Amount and/or Complexity of Data Reviewed  ECG/medicine tests:      Details: EKG showing normal sinus rhythm with sinus arrhythmia 70 bpm.  .  QRS 6 8.  QTc 438.  No acute ST changes.        Procedure  Procedures         [1]   Past Medical History:  Diagnosis Date    Hidradenitis suppurativa     Hypertension     Other specified acute skin changes due to ultraviolet radiation     Photodermatitis    Personal history of other diseases of the musculoskeletal system and connective tissue     History of rheumatoid arthritis    Sciatica, unspecified side     Sciatic pain   [2]   Past Surgical History:  Procedure Laterality Date    OTHER SURGICAL HISTORY  07/28/2021    Cholecystectomy    OTHER SURGICAL HISTORY  2019    Hysterectomy    OTHER SURGICAL HISTORY  07/28/2021    Ectopic pregnancy removal    OTHER SURGICAL HISTORY  01/06/2022    Esophagogastroduodenoscopy    OTHER SURGICAL HISTORY  01/06/2022    Colonoscopy   [3]   Family History  Problem Relation Name Age of Onset    Breast cancer Maternal Grandmother  40 - 49   [4]   Social History  Tobacco Use    Smoking status: Every Day     Current packs/day: 0.50     Types: Cigarettes    Smokeless tobacco: Never   Substance Use Topics    Alcohol use: Yes     Comment: Social drinking    Drug use: Yes     Types: Marijuana        Johnathan Barksdale PA-C  06/11/25 6484

## 2025-06-12 LAB
ALBUMIN SERPL BCP-MCNC: 3.4 G/DL (ref 3.4–5)
ALP SERPL-CCNC: 68 U/L (ref 33–110)
ALT SERPL W P-5'-P-CCNC: 8 U/L (ref 7–45)
ANION GAP SERPL CALC-SCNC: 12 MMOL/L (ref 10–20)
AST SERPL W P-5'-P-CCNC: 9 U/L (ref 9–39)
ATRIAL RATE: 70 BPM
BILIRUB SERPL-MCNC: 0.6 MG/DL (ref 0–1.2)
BUN SERPL-MCNC: 5 MG/DL (ref 6–23)
CALCIUM SERPL-MCNC: 8.9 MG/DL (ref 8.6–10.6)
CHLORIDE SERPL-SCNC: 101 MMOL/L (ref 98–107)
CO2 SERPL-SCNC: 27 MMOL/L (ref 21–32)
CREAT SERPL-MCNC: 0.72 MG/DL (ref 0.5–1.05)
EGFRCR SERPLBLD CKD-EPI 2021: >90 ML/MIN/1.73M*2
ERYTHROCYTE [DISTWIDTH] IN BLOOD BY AUTOMATED COUNT: 13.6 % (ref 11.5–14.5)
GLUCOSE SERPL-MCNC: 99 MG/DL (ref 74–99)
HCT VFR BLD AUTO: 41 % (ref 36–46)
HGB BLD-MCNC: 12.9 G/DL (ref 12–16)
HOLD SPECIMEN: NORMAL
MCH RBC QN AUTO: 29.9 PG (ref 26–34)
MCHC RBC AUTO-ENTMCNC: 31.5 G/DL (ref 32–36)
MCV RBC AUTO: 95 FL (ref 80–100)
NRBC BLD-RTO: 0 /100 WBCS (ref 0–0)
P AXIS: 68 DEGREES
P OFFSET: 194 MS
P ONSET: 145 MS
PLATELET # BLD AUTO: 272 X10*3/UL (ref 150–450)
POTASSIUM SERPL-SCNC: 3.5 MMOL/L (ref 3.5–5.3)
PR INTERVAL: 154 MS
PROT SERPL-MCNC: 6.7 G/DL (ref 6.4–8.2)
Q ONSET: 222 MS
QRS COUNT: 11 BEATS
QRS DURATION: 68 MS
QT INTERVAL: 406 MS
QTC CALCULATION(BAZETT): 438 MS
QTC FREDERICIA: 427 MS
R AXIS: 47 DEGREES
RBC # BLD AUTO: 4.31 X10*6/UL (ref 4–5.2)
SODIUM SERPL-SCNC: 136 MMOL/L (ref 136–145)
T AXIS: 55 DEGREES
T OFFSET: 425 MS
VENTRICULAR RATE: 70 BPM
WBC # BLD AUTO: 6.1 X10*3/UL (ref 4.4–11.3)

## 2025-06-12 PROCEDURE — 99233 SBSQ HOSP IP/OBS HIGH 50: CPT | Performed by: INTERNAL MEDICINE

## 2025-06-12 PROCEDURE — 2500000004 HC RX 250 GENERAL PHARMACY W/ HCPCS (ALT 636 FOR OP/ED): Mod: SE | Performed by: INTERNAL MEDICINE

## 2025-06-12 PROCEDURE — 80053 COMPREHEN METABOLIC PANEL: CPT

## 2025-06-12 PROCEDURE — 85027 COMPLETE CBC AUTOMATED: CPT

## 2025-06-12 PROCEDURE — 2500000001 HC RX 250 WO HCPCS SELF ADMINISTERED DRUGS (ALT 637 FOR MEDICARE OP): Mod: SE | Performed by: INTERNAL MEDICINE

## 2025-06-12 PROCEDURE — 2500000004 HC RX 250 GENERAL PHARMACY W/ HCPCS (ALT 636 FOR OP/ED): Mod: SE

## 2025-06-12 PROCEDURE — 1210000001 HC SEMI-PRIVATE ROOM DAILY

## 2025-06-12 PROCEDURE — 2500000002 HC RX 250 W HCPCS SELF ADMINISTERED DRUGS (ALT 637 FOR MEDICARE OP, ALT 636 FOR OP/ED): Mod: SE | Performed by: INTERNAL MEDICINE

## 2025-06-12 PROCEDURE — 2500000001 HC RX 250 WO HCPCS SELF ADMINISTERED DRUGS (ALT 637 FOR MEDICARE OP): Mod: SE

## 2025-06-12 PROCEDURE — 36415 COLL VENOUS BLD VENIPUNCTURE: CPT

## 2025-06-12 RX ORDER — AMLODIPINE BESYLATE 5 MG/1
5 TABLET ORAL DAILY
Status: DISCONTINUED | OUTPATIENT
Start: 2025-06-12 | End: 2025-06-15 | Stop reason: HOSPADM

## 2025-06-12 RX ORDER — SODIUM CHLORIDE, SODIUM LACTATE, POTASSIUM CHLORIDE, CALCIUM CHLORIDE 600; 310; 30; 20 MG/100ML; MG/100ML; MG/100ML; MG/100ML
125 INJECTION, SOLUTION INTRAVENOUS CONTINUOUS
Status: ACTIVE | OUTPATIENT
Start: 2025-06-12 | End: 2025-06-13

## 2025-06-12 RX ORDER — OXYCODONE HYDROCHLORIDE 5 MG/1
5 TABLET ORAL EVERY 4 HOURS PRN
Refills: 0 | Status: DISCONTINUED | OUTPATIENT
Start: 2025-06-12 | End: 2025-06-14

## 2025-06-12 RX ORDER — ACETAMINOPHEN 325 MG/1
975 TABLET ORAL EVERY 8 HOURS
Status: DISCONTINUED | OUTPATIENT
Start: 2025-06-12 | End: 2025-06-15 | Stop reason: HOSPADM

## 2025-06-12 RX ORDER — KETOROLAC TROMETHAMINE 30 MG/ML
30 INJECTION, SOLUTION INTRAMUSCULAR; INTRAVENOUS EVERY 6 HOURS PRN
Status: DISCONTINUED | OUTPATIENT
Start: 2025-06-12 | End: 2025-06-15 | Stop reason: HOSPADM

## 2025-06-12 RX ORDER — QUETIAPINE FUMARATE 25 MG/1
50 TABLET, FILM COATED ORAL 2 TIMES DAILY
Status: DISCONTINUED | OUTPATIENT
Start: 2025-06-12 | End: 2025-06-14

## 2025-06-12 RX ORDER — HYDRALAZINE HYDROCHLORIDE 20 MG/ML
10 INJECTION INTRAMUSCULAR; INTRAVENOUS EVERY 6 HOURS PRN
Status: DISCONTINUED | OUTPATIENT
Start: 2025-06-12 | End: 2025-06-15 | Stop reason: HOSPADM

## 2025-06-12 RX ADMIN — ENOXAPARIN SODIUM 40 MG: 100 INJECTION SUBCUTANEOUS at 08:43

## 2025-06-12 RX ADMIN — OXYCODONE 5 MG: 5 TABLET ORAL at 06:25

## 2025-06-12 RX ADMIN — SODIUM CHLORIDE, POTASSIUM CHLORIDE, SODIUM LACTATE AND CALCIUM CHLORIDE 125 ML/HR: 600; 310; 30; 20 INJECTION, SOLUTION INTRAVENOUS at 23:20

## 2025-06-12 RX ADMIN — SODIUM CHLORIDE, POTASSIUM CHLORIDE, SODIUM LACTATE AND CALCIUM CHLORIDE 125 ML/HR: 600; 310; 30; 20 INJECTION, SOLUTION INTRAVENOUS at 08:43

## 2025-06-12 RX ADMIN — ACETAMINOPHEN 650 MG: 325 TABLET ORAL at 00:03

## 2025-06-12 RX ADMIN — OXYCODONE 5 MG: 5 TABLET ORAL at 11:04

## 2025-06-12 RX ADMIN — OXYCODONE 5 MG: 5 TABLET ORAL at 15:26

## 2025-06-12 RX ADMIN — ENOXAPARIN SODIUM 40 MG: 100 INJECTION SUBCUTANEOUS at 02:43

## 2025-06-12 RX ADMIN — ONDANSETRON 4 MG: 2 INJECTION INTRAMUSCULAR; INTRAVENOUS at 06:59

## 2025-06-12 RX ADMIN — AMLODIPINE BESYLATE 5 MG: 5 TABLET ORAL at 15:26

## 2025-06-12 RX ADMIN — QUETIAPINE FUMARATE 50 MG: 25 TABLET ORAL at 20:15

## 2025-06-12 RX ADMIN — HYDRALAZINE HYDROCHLORIDE 10 MG: 20 INJECTION INTRAMUSCULAR; INTRAVENOUS at 15:26

## 2025-06-12 RX ADMIN — PANTOPRAZOLE SODIUM 40 MG: 40 TABLET, DELAYED RELEASE ORAL at 06:26

## 2025-06-12 RX ADMIN — ENOXAPARIN SODIUM 40 MG: 100 INJECTION SUBCUTANEOUS at 20:15

## 2025-06-12 RX ADMIN — OXYCODONE 5 MG: 5 TABLET ORAL at 19:56

## 2025-06-12 SDOH — ECONOMIC STABILITY: FOOD INSECURITY: HOW HARD IS IT FOR YOU TO PAY FOR THE VERY BASICS LIKE FOOD, HOUSING, MEDICAL CARE, AND HEATING?: NOT VERY HARD

## 2025-06-12 SDOH — SOCIAL STABILITY: SOCIAL NETWORK: HOW OFTEN DO YOU GET TOGETHER WITH FRIENDS OR RELATIVES?: PATIENT DECLINED

## 2025-06-12 SDOH — HEALTH STABILITY: PHYSICAL HEALTH: ON AVERAGE, HOW MANY DAYS PER WEEK DO YOU ENGAGE IN MODERATE TO STRENUOUS EXERCISE (LIKE A BRISK WALK)?: 2 DAYS

## 2025-06-12 SDOH — SOCIAL STABILITY: SOCIAL INSECURITY: WITHIN THE LAST YEAR, HAVE YOU BEEN HUMILIATED OR EMOTIONALLY ABUSED IN OTHER WAYS BY YOUR PARTNER OR EX-PARTNER?: NO

## 2025-06-12 SDOH — ECONOMIC STABILITY: INCOME INSECURITY: IN THE PAST 12 MONTHS HAS THE ELECTRIC, GAS, OIL, OR WATER COMPANY THREATENED TO SHUT OFF SERVICES IN YOUR HOME?: NO

## 2025-06-12 SDOH — ECONOMIC STABILITY: HOUSING INSECURITY: IN THE LAST 12 MONTHS, WAS THERE A TIME WHEN YOU WERE NOT ABLE TO PAY THE MORTGAGE OR RENT ON TIME?: YES

## 2025-06-12 SDOH — HEALTH STABILITY: PHYSICAL HEALTH
HOW OFTEN DO YOU NEED TO HAVE SOMEONE HELP YOU WHEN YOU READ INSTRUCTIONS, PAMPHLETS, OR OTHER WRITTEN MATERIAL FROM YOUR DOCTOR OR PHARMACY?: NEVER

## 2025-06-12 SDOH — SOCIAL STABILITY: SOCIAL NETWORK: HOW OFTEN DO YOU ATTEND MEETINGS OF THE CLUBS OR ORGANIZATIONS YOU BELONG TO?: PATIENT DECLINED

## 2025-06-12 SDOH — ECONOMIC STABILITY: FOOD INSECURITY: WITHIN THE PAST 12 MONTHS, THE FOOD YOU BOUGHT JUST DIDN'T LAST AND YOU DIDN'T HAVE MONEY TO GET MORE.: NEVER TRUE

## 2025-06-12 SDOH — SOCIAL STABILITY: SOCIAL INSECURITY: ARE YOU MARRIED, WIDOWED, DIVORCED, SEPARATED, NEVER MARRIED, OR LIVING WITH A PARTNER?: PATIENT DECLINED

## 2025-06-12 SDOH — SOCIAL STABILITY: SOCIAL INSECURITY: WITHIN THE LAST YEAR, HAVE YOU BEEN AFRAID OF YOUR PARTNER OR EX-PARTNER?: NO

## 2025-06-12 SDOH — SOCIAL STABILITY: SOCIAL NETWORK
DO YOU BELONG TO ANY CLUBS OR ORGANIZATIONS SUCH AS CHURCH GROUPS, UNIONS, FRATERNAL OR ATHLETIC GROUPS, OR SCHOOL GROUPS?: PATIENT DECLINED

## 2025-06-12 SDOH — SOCIAL STABILITY: SOCIAL INSECURITY
WITHIN THE LAST YEAR, HAVE YOU BEEN KICKED, HIT, SLAPPED, OR OTHERWISE PHYSICALLY HURT BY YOUR PARTNER OR EX-PARTNER?: NO

## 2025-06-12 SDOH — ECONOMIC STABILITY: FOOD INSECURITY: WITHIN THE PAST 12 MONTHS, YOU WORRIED THAT YOUR FOOD WOULD RUN OUT BEFORE YOU GOT THE MONEY TO BUY MORE.: NEVER TRUE

## 2025-06-12 SDOH — HEALTH STABILITY: MENTAL HEALTH
DO YOU FEEL STRESS - TENSE, RESTLESS, NERVOUS, OR ANXIOUS, OR UNABLE TO SLEEP AT NIGHT BECAUSE YOUR MIND IS TROUBLED ALL THE TIME - THESE DAYS?: NOT AT ALL

## 2025-06-12 SDOH — SOCIAL STABILITY: SOCIAL NETWORK: IN A TYPICAL WEEK, HOW MANY TIMES DO YOU TALK ON THE PHONE WITH FAMILY, FRIENDS, OR NEIGHBORS?: PATIENT DECLINED

## 2025-06-12 SDOH — ECONOMIC STABILITY: HOUSING INSECURITY: IN THE PAST 12 MONTHS, HOW MANY TIMES HAVE YOU MOVED WHERE YOU WERE LIVING?: 1

## 2025-06-12 SDOH — SOCIAL STABILITY: SOCIAL NETWORK: HOW OFTEN DO YOU ATTEND CHURCH OR RELIGIOUS SERVICES?: PATIENT DECLINED

## 2025-06-12 SDOH — SOCIAL STABILITY: SOCIAL INSECURITY
WITHIN THE LAST YEAR, HAVE YOU BEEN RAPED OR FORCED TO HAVE ANY KIND OF SEXUAL ACTIVITY BY YOUR PARTNER OR EX-PARTNER?: NO

## 2025-06-12 SDOH — ECONOMIC STABILITY: HOUSING INSECURITY: AT ANY TIME IN THE PAST 12 MONTHS, WERE YOU HOMELESS OR LIVING IN A SHELTER (INCLUDING NOW)?: NO

## 2025-06-12 SDOH — HEALTH STABILITY: PHYSICAL HEALTH: ON AVERAGE, HOW MANY MINUTES DO YOU ENGAGE IN EXERCISE AT THIS LEVEL?: 10 MIN

## 2025-06-12 SDOH — ECONOMIC STABILITY: TRANSPORTATION INSECURITY: IN THE PAST 12 MONTHS, HAS LACK OF TRANSPORTATION KEPT YOU FROM MEDICAL APPOINTMENTS OR FROM GETTING MEDICATIONS?: NO

## 2025-06-12 SDOH — SOCIAL STABILITY: SOCIAL INSECURITY: HAVE YOU HAD THOUGHTS OF HARMING ANYONE ELSE?: NO

## 2025-06-12 SDOH — SOCIAL STABILITY: SOCIAL INSECURITY: WERE YOU ABLE TO COMPLETE ALL THE BEHAVIORAL HEALTH SCREENINGS?: YES

## 2025-06-12 ASSESSMENT — COGNITIVE AND FUNCTIONAL STATUS - GENERAL
MOBILITY SCORE: 24
WALKING IN HOSPITAL ROOM: A LITTLE
MOBILITY SCORE: 22
DAILY ACTIVITIY SCORE: 24
PATIENT BASELINE BEDBOUND: NO
CLIMB 3 TO 5 STEPS WITH RAILING: A LITTLE
PATIENT BASELINE BEDBOUND: NO

## 2025-06-12 ASSESSMENT — ACTIVITIES OF DAILY LIVING (ADL)
JUDGMENT_ADEQUATE_SAFELY_COMPLETE_DAILY_ACTIVITIES: NO
BATHING: INDEPENDENT
TOILETING: INDEPENDENT
PATIENT'S MEMORY ADEQUATE TO SAFELY COMPLETE DAILY ACTIVITIES?: YES
LACK_OF_TRANSPORTATION: NO
LACK_OF_TRANSPORTATION: NO
FEEDING YOURSELF: INDEPENDENT
HEARING - RIGHT EAR: FUNCTIONAL
ADEQUATE_TO_COMPLETE_ADL: YES
DRESSING YOURSELF: INDEPENDENT
LACK_OF_TRANSPORTATION: NO
WALKS IN HOME: INDEPENDENT
HEARING - LEFT EAR: FUNCTIONAL
GROOMING: INDEPENDENT

## 2025-06-12 ASSESSMENT — PAIN DESCRIPTION - DESCRIPTORS
DESCRIPTORS: SHARP
DESCRIPTORS: SHARP

## 2025-06-12 ASSESSMENT — LIFESTYLE VARIABLES
PRESCIPTION_ABUSE_PAST_12_MONTHS: NO
SUBSTANCE_ABUSE_PAST_12_MONTHS: YES
SKIP TO QUESTIONS 9-10: 1
HOW MANY STANDARD DRINKS CONTAINING ALCOHOL DO YOU HAVE ON A TYPICAL DAY: 1 OR 2
HOW OFTEN DO YOU HAVE A DRINK CONTAINING ALCOHOL: MONTHLY OR LESS
AUDIT-C TOTAL SCORE: 1
AUDIT-C TOTAL SCORE: 1
HOW OFTEN DO YOU HAVE 6 OR MORE DRINKS ON ONE OCCASION: NEVER

## 2025-06-12 ASSESSMENT — PAIN - FUNCTIONAL ASSESSMENT
PAIN_FUNCTIONAL_ASSESSMENT: 0-10

## 2025-06-12 ASSESSMENT — PATIENT HEALTH QUESTIONNAIRE - PHQ9
1. LITTLE INTEREST OR PLEASURE IN DOING THINGS: NOT AT ALL
SUM OF ALL RESPONSES TO PHQ9 QUESTIONS 1 & 2: 0
2. FEELING DOWN, DEPRESSED OR HOPELESS: NOT AT ALL

## 2025-06-12 ASSESSMENT — PAIN SCALES - GENERAL
PAINLEVEL_OUTOF10: 5 - MODERATE PAIN
PAINLEVEL_OUTOF10: 6
PAINLEVEL_OUTOF10: 9
PAINLEVEL_OUTOF10: 9
PAINLEVEL_OUTOF10: 8
PAINLEVEL_OUTOF10: 3

## 2025-06-12 ASSESSMENT — PAIN DESCRIPTION - ORIENTATION: ORIENTATION: MID;UPPER

## 2025-06-12 ASSESSMENT — PAIN DESCRIPTION - LOCATION: LOCATION: ABDOMEN

## 2025-06-12 NOTE — PROGRESS NOTES
"Sudha Ervin is a 49 y.o. female on day 1 of admission presenting with Acute pancreatitis, unspecified complication status, unspecified pancreatitis type ([facility]).    Subjective   Patient rounded at bedside with bedside nursing.  Partially sitting up in bed.  No distress.  Has a tray of clear liquids in front of her.  Patient reported some vomiting this morning.  Epigastric pain about the same.      Objective     General: Lying in bed without distress.  Cooperative.  Severely obese.  Skin: No rashes or ulcerations.  HEENT: Sclera is white.  Mucous membranes mildly dry.  Neck: Supple.  No JVD.  Cardiac: Regular rate and rhythm, S1/S2 normal.  Lungs: Clear to auscultation bilaterally, no wheezing or crackles, no accessory muscle use at rest.  Abdomen: Soft, severely obese abdomen, tenderness elicited with palpation to mostly epigastric, but also some tenderness trigger with right upper quadrant and left upper quadrant palpation, BS +  Extremities: No cyanosis.  No lower extremity edema.  Neurologic: Alert and oriented x3.  No focal deficits.  Psychiatric: Appropriate mood and behavior.  Currently no agitation.    Last Recorded Vitals  Blood pressure (!) 174/92, pulse 67, temperature 36.7 °C (98.1 °F), resp. rate 20, height 1.651 m (5' 5\"), weight 112 kg (247 lb), SpO2 98%.  On room air.    Intake/Output last 3 Shifts:  No intake/output data recorded.    Relevant Results  Scheduled Medications[1]   Continuous Medications[2]   PRN Medications[3]     Results for orders placed or performed during the hospital encounter of 06/11/25 (from the past 24 hours)   Urinalysis with Reflex Microscopic   Result Value Ref Range    Color, Urine Light-Yellow Light-Yellow, Yellow, Dark-Yellow    Appearance, Urine Clear Clear    Specific Gravity, Urine >1.050 (N) 1.005 - 1.035    pH, Urine 7.0 5.0, 5.5, 6.0, 6.5, 7.0, 7.5, 8.0    Protein, Urine 10 (TRACE) NEGATIVE, 10 (TRACE), 20 (TRACE) mg/dL    Glucose, Urine Normal Normal mg/dL "    Blood, Urine NEGATIVE NEGATIVE mg/dL    Ketones, Urine NEGATIVE NEGATIVE mg/dL    Bilirubin, Urine NEGATIVE NEGATIVE mg/dL    Urobilinogen, Urine Normal Normal mg/dL    Nitrite, Urine NEGATIVE NEGATIVE    Leukocyte Esterase, Urine NEGATIVE NEGATIVE   Urine Gray Tube   Result Value Ref Range    Extra Tube Hold for add-ons.    Microscopic Only, Urine   Result Value Ref Range    WBC, Urine NONE 1-5, NONE /HPF    RBC, Urine 1-2 NONE, 1-2, 3-5 /HPF    Squamous Epithelial Cells, Urine 1-9 (SPARSE) Reference range not established. /HPF   POCT pregnancy, urine   Result Value Ref Range    Preg Test, Ur Negative    Comprehensive metabolic panel   Result Value Ref Range    Glucose 99 74 - 99 mg/dL    Sodium 136 136 - 145 mmol/L    Potassium 3.5 3.5 - 5.3 mmol/L    Chloride 101 98 - 107 mmol/L    Bicarbonate 27 21 - 32 mmol/L    Anion Gap 12 10 - 20 mmol/L    Urea Nitrogen 5 (L) 6 - 23 mg/dL    Creatinine 0.72 0.50 - 1.05 mg/dL    eGFR >90 >60 mL/min/1.73m*2    Calcium 8.9 8.6 - 10.6 mg/dL    Albumin 3.4 3.4 - 5.0 g/dL    Alkaline Phosphatase 68 33 - 110 U/L    Total Protein 6.7 6.4 - 8.2 g/dL    AST 9 9 - 39 U/L    Bilirubin, Total 0.6 0.0 - 1.2 mg/dL    ALT 8 7 - 45 U/L   CBC   Result Value Ref Range    WBC 6.1 4.4 - 11.3 x10*3/uL    nRBC 0.0 0.0 - 0.0 /100 WBCs    RBC 4.31 4.00 - 5.20 x10*6/uL    Hemoglobin 12.9 12.0 - 16.0 g/dL    Hematocrit 41.0 36.0 - 46.0 %    MCV 95 80 - 100 fL    MCH 29.9 26.0 - 34.0 pg    MCHC 31.5 (L) 32.0 - 36.0 g/dL    RDW 13.6 11.5 - 14.5 %    Platelets 272 150 - 450 x10*3/uL      Imaging  CT abdomen pelvis w IV contrast  Result Date: 6/11/2025  1.Findings compatible with mild acute pancreatitis involving the head and uncinate process of pancreas. No pseudocyst or abscess. 2.Mild hepatic steatosis. 3.Right adnexal cyst measuring 3 cm. Signed by Jeff Fields MD    XR chest 2 views  Result Date: 6/11/2025  1.  No evidence of acute cardiopulmonary process.       MACRO: None   Signed by: Mathew  Nia 6/11/2025 8:50 AM Dictation workstation:   [id number]      Hospital Course:  Sudha Ervin is a 49 y.o. female presenting with PMMH of HTN, GERD, IBS, [place], presented today complaining of epigastic abdominal pain radiate to the the back, the pain started after the patient was sick 2 weeks ago, she started to have flu like symptoms two weeks ago when the pain started.  Lipase level normal.  Patient had epigastric abdominal pain with radiation to back.  CT imaging did show evidence of inflammation of the pancreas.  Patient diagnosed with acute pancreatitis.  Denies alcohol usage.  Has had cholecystectomy 10 years ago and imaging currently does not show any obstruction of ducts.  Based on patient's story it is possible etiology of pancreatitis is viral.    Assessment and plan:    Acute pancreatitis  -Continue IV fluids but as LR at 125 ml/hr.  - Monitor BMP.  - Continue clear liquids for now.  - Continue Protonix 40 mg daily.  IV Zofran every 4 hours as needed for nausea.  - Pain regimen: Scheduled Tylenol 975 mg every 8 hours, start IV Toradol 30 mg every 6 hours as needed for moderate pain, change oxycodone to 5 mg every 4 hours as needed for severe pain.    Primary hypertension  -Currently holding home chlorthalidone 25 mg daily due to nausea and vomiting.  - Systolic 150s to 170s but this could also be caused by pain.  - Add IV hydralazine as needed for SBP > 180 or DBP > 100.    History of GERD   History of IBS  -Continue Protonix 40 mg daily.  -Currently holding Linzess.    Hyperlipidemia  - Hold atorvastatin for now.    Disposition: Continue IV fluid hydration, monitor electrolytes, monitor nausea vomiting and pain.    Alexander Salmon MD           [1] enoxaparin, 40 mg, subcutaneous, q12h KARI  pantoprazole, 40 mg, oral, Daily before breakfast   Or  esomeprazole, 40 mg, nasoduodenal tube, Daily before breakfast   Or  pantoprazole, 40 mg, intravenous, Daily before breakfast    [2] sodium chloride  0.9%, 100 mL/hr, Last Rate: 100 mL/hr (06/11/25 1513)    [3] PRN medications: acetaminophen **OR** acetaminophen **OR** acetaminophen, famotidine, ondansetron ODT **OR** ondansetron, oxyCODONE

## 2025-06-12 NOTE — CONSULTS
"Nutrition Assessment      Reason for Assessment: Admission nursing screening    Sudha Ervin is a 49 y.o. female on day 1 of admission presenting with Acute pancreatitis, unspecified complication status, unspecified pancreatitis type.    Nutrition History:  Food and Nutrient History: Met with patient who reports 1 week of nausea, vomiting, abdominal pain and low PO intake. States she drank some apple juice and tea today. Reports N/V earlier this morning, but denies either at the moment. \"I'm constipated.\" This Clinician discussed starting Ensure Clear and patient agreed to try both flavors once she's feeling better.  Vitamin/Herbal Supplement Use: Patient denies.  Food Allergy:  (Denies.)       Anthropometrics:  Height: 165.1 cm (5' 5\")   Weight: 112 kg (247 lb)   BMI (Calculated): 41.1  IBW/kg (Dietitian Calculated): 56.8 kg  Percent of IBW: 197 %  Adjusted Body Weight (kg): 70.6 kg                   Weight History:   Wt Readings from Last 10 Encounters:   06/11/25 112 kg (247 lb)   09/19/24 112 kg (247 lb 4.8 oz)   03/12/24 112 kg (247 lb 11.2 oz)   12/12/23 112 kg (247 lb)   12/08/22 106 kg (233 lb 11 oz)   05/19/22 112 kg (246 lb 7.6 oz)   01/06/22 111 kg (244 lb 6.4 oz)   11/04/21 114 kg (251 lb 8 oz)   10/04/21 112 kg (247 lb 4 oz)   07/28/21 110 kg (242 lb)       Weight Change %:  Weight History / % Weight Change: Patient denies weight change.    Nutrition Focused Physical Exam Findings:  Deferred r/t patient declined exam. Patient is lying on her side covered with multiple blankets.       Edema:  Edema: none  Physical Findings:  Hair: Negative  Eyes: Negative  Skin: Negative  Digestive System Findings: Abdominal distension, Constipation, Anorexia, Nausea, Vomiting  Mouth Findings:  (Denies chewing/swallowing problems.)    Nutrition Significant Labs:  CBC Trend:   Results from last 7 days   Lab Units 06/12/25  0554 06/11/25  0819   WBC AUTO x10*3/uL 6.1 6.5   RBC AUTO x10*6/uL 4.31 4.67   HEMOGLOBIN " "g/dL 12.9 14.3   HEMATOCRIT % 41.0 41.2   MCV fL 95 88   PLATELETS AUTO x10*3/uL 272 304    , BMP Trend:   Results from last 7 days   Lab Units 06/12/25  0554 06/11/25  0819   GLUCOSE mg/dL 99 103*   CALCIUM mg/dL 8.9 9.0   SODIUM mmol/L 136 138   POTASSIUM mmol/L 3.5 3.4*   CO2 mmol/L 27 27   CHLORIDE mmol/L 101 100   BUN mg/dL 5* 6   CREATININE mg/dL 0.72 0.75    , A1C:No results found for: \"HGBA1C\", BG POCT trend:    , Renal Lab Trend:   Results from last 7 days   Lab Units 06/12/25  0554 06/11/25  0819   POTASSIUM mmol/L 3.5 3.4*   SODIUM mmol/L 136 138   EGFR mL/min/1.73m*2 >90 >90   BUN mg/dL 5* 6   CREATININE mg/dL 0.72 0.75    , Vit D: No results found for: \"VITD25\" , Vit B12: No results found for: \"YJCIQUGF81\"     Nutrition Specific Medications:    Scheduled medications  acetaminophen, 975 mg, oral, q8h  enoxaparin, 40 mg, subcutaneous, q12h KARI  pantoprazole, 40 mg, oral, Daily before breakfast  QUEtiapine, 50 mg, oral, BID    Continuous medications  lactated Ringer's, 125 mL/hr, Last Rate: 125 mL/hr (06/12/25 0843)    PRN medications  PRN medications: hydrALAZINE, ketorolac, ondansetron ODT **OR** ondansetron, oxyCODONE    I/O:    ;      Dietary Orders (From admission, onward)       Start     Ordered    06/12/25 0646  May Participate in Room Service  ( ROOM SERVICE MAY PARTICIPATE)  Once        Question:  .  Answer:  Yes    06/12/25 0645    06/11/25 1436  Adult diet Clear Liquid  Diet effective now        Question:  Diet type  Answer:  Clear Liquid    06/11/25 1435                     Estimated Needs:   Total Energy Estimated Needs in 24 hours (kCal):  (7953-1028)  Method for Estimating Needs: 70.6 kg / 25-27 kcal  Total Protein Estimated Needs in 24 Hours (g): 85 g  Method for Estimating 24 Hour Protein Needs: 70.6 kg / 1.2 g              Nutrition Diagnosis        Nutrition Diagnosis  Patient has Nutrition Diagnosis: Yes  Diagnosis Status (1): New  Nutrition Diagnosis 1: Inadequate oral " intake  Related to (1): pancreatitis  As Evidenced by (1): report of decreased PO intake x 1 week; taking sips of Clear liquids this admission.       Nutrition Interventions/Recommendations   Nutrition prescription for oral nutrition    Nutrition Recommendations:  Individualized Nutrition Prescription Provided for : Clear liquids - advance to Low Fat diet when able. Ensure Clear (240 kcal and 8 g protein) ordered BID.    Nutrition Interventions/Goals:   Meals and Snacks: Fat-modified diet  Medical Food Supplement: Commercial beverage medical food supplement therapy  Goal: >/= 1 Ensure Clear a day.      Education Documentation  Not indicated at this time. (Abdominal pain/difficulty concentrating).             Nutrition Monitoring and Evaluation   Intake / Amount of food: Consumes at least 50% or more of meals/snacks/supplements         Goal Status: New goal(s) identified    Time Spent (min): 45 minutes

## 2025-06-12 NOTE — CARE PLAN
Problem: Pain - Adult  Goal: Verbalizes/displays adequate comfort level or baseline comfort level  Outcome: Progressing     Problem: Safety - Adult  Goal: Free from fall injury  Outcome: Progressing     Problem: Discharge Planning  Goal: Discharge to home or other facility with appropriate resources  Outcome: Progressing     Problem: Chronic Conditions and Co-morbidities  Goal: Patient's chronic conditions and co-morbidity symptoms are monitored and maintained or improved  Outcome: Progressing     Problem: Nutrition  Goal: Nutrient intake appropriate for maintaining nutritional needs  Outcome: Progressing   The patient's goals for the shift include      The clinical goals for the shift include pt will have decreased pain

## 2025-06-12 NOTE — PROGRESS NOTES
06/12/25 1333   Discharge Planning   Living Arrangements Alone   Support Systems Children   Assistance Needed Independent for adls   Type of Residence Private residence   Number of Stairs to Enter Residence 0   Number of Stairs Within Residence 15   Home or Post Acute Services None   Expected Discharge Disposition Home   Does the patient need discharge transport arranged? No   Financial Resource Strain   How hard is it for you to pay for the very basics like food, housing, medical care, and heating? Not hard   Housing Stability   In the last 12 months, was there a time when you were not able to pay the mortgage or rent on time? N   At any time in the past 12 months, were you homeless or living in a shelter (including now)? N   Transportation Needs   In the past 12 months, has lack of transportation kept you from medical appointments or from getting medications? no   In the past 12 months, has lack of transportation kept you from meetings, work, or from getting things needed for daily living? No   Stroke Family Assessment   Stroke Family Assessment Needed No   Intensity of Service   Intensity of Service 0-30 min     Transitional Care Coordination Progress Note:  Patient discussed during interdisciplinary rounds.   Team members present: MD, LAQUITA  Plan per Medical/Surgical team: Acute pancreatitis  Payor: [place]  Discharge disposition: Home  Potential Barriers: none  ADOD: 1-2 days    Previous Home Care: NA  DME: Cane  Pharmacy: [facility] [address]  Falls: Denies  PCP:  PCP at [facility]; last visit Feb 2025  Met with patient at bedside, provided introduction of self and role. Patient states she lives at home alone. Independent for adls; safe at home. Patient states either family/friends provide transport to appointments or she uses insurance transportation. Patient states no concerns obtaining/affording medications; states no social/financial concerns. Patient states she will call for ride home at time of discharge.  Will continue to follow for discharge planning needs.     Laura MARKN, RN  Transitional Care Coordinator (TCC)  [phone]

## 2025-06-13 LAB
ANION GAP SERPL CALC-SCNC: 14 MMOL/L (ref 10–20)
BUN SERPL-MCNC: 4 MG/DL (ref 6–23)
CALCIUM SERPL-MCNC: 8.7 MG/DL (ref 8.6–10.6)
CHLORIDE SERPL-SCNC: 103 MMOL/L (ref 98–107)
CO2 SERPL-SCNC: 24 MMOL/L (ref 21–32)
CREAT SERPL-MCNC: 0.75 MG/DL (ref 0.5–1.05)
EGFRCR SERPLBLD CKD-EPI 2021: >90 ML/MIN/1.73M*2
GLUCOSE SERPL-MCNC: 84 MG/DL (ref 74–99)
POTASSIUM SERPL-SCNC: 3.6 MMOL/L (ref 3.5–5.3)
SODIUM SERPL-SCNC: 137 MMOL/L (ref 136–145)

## 2025-06-13 PROCEDURE — 36415 COLL VENOUS BLD VENIPUNCTURE: CPT | Performed by: INTERNAL MEDICINE

## 2025-06-13 PROCEDURE — 82374 ASSAY BLOOD CARBON DIOXIDE: CPT | Performed by: INTERNAL MEDICINE

## 2025-06-13 PROCEDURE — 99233 SBSQ HOSP IP/OBS HIGH 50: CPT | Performed by: INTERNAL MEDICINE

## 2025-06-13 PROCEDURE — 2500000002 HC RX 250 W HCPCS SELF ADMINISTERED DRUGS (ALT 637 FOR MEDICARE OP, ALT 636 FOR OP/ED): Mod: SE | Performed by: INTERNAL MEDICINE

## 2025-06-13 PROCEDURE — 2500000004 HC RX 250 GENERAL PHARMACY W/ HCPCS (ALT 636 FOR OP/ED): Mod: SE

## 2025-06-13 PROCEDURE — 1210000001 HC SEMI-PRIVATE ROOM DAILY

## 2025-06-13 PROCEDURE — 2500000001 HC RX 250 WO HCPCS SELF ADMINISTERED DRUGS (ALT 637 FOR MEDICARE OP): Mod: SE

## 2025-06-13 PROCEDURE — 2500000001 HC RX 250 WO HCPCS SELF ADMINISTERED DRUGS (ALT 637 FOR MEDICARE OP): Mod: SE | Performed by: INTERNAL MEDICINE

## 2025-06-13 PROCEDURE — 2500000004 HC RX 250 GENERAL PHARMACY W/ HCPCS (ALT 636 FOR OP/ED): Mod: SE | Performed by: INTERNAL MEDICINE

## 2025-06-13 RX ORDER — SODIUM CHLORIDE, SODIUM LACTATE, POTASSIUM CHLORIDE, CALCIUM CHLORIDE 600; 310; 30; 20 MG/100ML; MG/100ML; MG/100ML; MG/100ML
125 INJECTION, SOLUTION INTRAVENOUS CONTINUOUS
Status: ACTIVE | OUTPATIENT
Start: 2025-06-13 | End: 2025-06-14

## 2025-06-13 RX ADMIN — ENOXAPARIN SODIUM 40 MG: 100 INJECTION SUBCUTANEOUS at 08:41

## 2025-06-13 RX ADMIN — PANTOPRAZOLE SODIUM 40 MG: 40 TABLET, DELAYED RELEASE ORAL at 06:41

## 2025-06-13 RX ADMIN — OXYCODONE 5 MG: 5 TABLET ORAL at 03:19

## 2025-06-13 RX ADMIN — SODIUM CHLORIDE, SODIUM LACTATE, POTASSIUM CHLORIDE, AND CALCIUM CHLORIDE 125 ML/HR: .6; .31; .03; .02 INJECTION, SOLUTION INTRAVENOUS at 08:42

## 2025-06-13 RX ADMIN — OXYCODONE 5 MG: 5 TABLET ORAL at 08:40

## 2025-06-13 RX ADMIN — QUETIAPINE FUMARATE 50 MG: 25 TABLET ORAL at 08:40

## 2025-06-13 RX ADMIN — ACETAMINOPHEN 975 MG: 325 TABLET ORAL at 08:40

## 2025-06-13 RX ADMIN — ACETAMINOPHEN 975 MG: 325 TABLET ORAL at 17:35

## 2025-06-13 RX ADMIN — ONDANSETRON 4 MG: 4 TABLET, ORALLY DISINTEGRATING ORAL at 08:28

## 2025-06-13 RX ADMIN — AMLODIPINE BESYLATE 5 MG: 5 TABLET ORAL at 08:41

## 2025-06-13 RX ADMIN — ENOXAPARIN SODIUM 40 MG: 100 INJECTION SUBCUTANEOUS at 20:57

## 2025-06-13 RX ADMIN — OXYCODONE 5 MG: 5 TABLET ORAL at 14:47

## 2025-06-13 RX ADMIN — OXYCODONE 5 MG: 5 TABLET ORAL at 21:25

## 2025-06-13 ASSESSMENT — PAIN SCALES - GENERAL
PAINLEVEL_OUTOF10: 8
PAINLEVEL_OUTOF10: 3
PAINLEVEL_OUTOF10: 8
PAINLEVEL_OUTOF10: 4

## 2025-06-13 ASSESSMENT — COGNITIVE AND FUNCTIONAL STATUS - GENERAL
DAILY ACTIVITIY SCORE: 24
MOBILITY SCORE: 24

## 2025-06-13 ASSESSMENT — PAIN - FUNCTIONAL ASSESSMENT
PAIN_FUNCTIONAL_ASSESSMENT: 0-10

## 2025-06-13 ASSESSMENT — PAIN DESCRIPTION - LOCATION: LOCATION: ABDOMEN

## 2025-06-13 ASSESSMENT — PAIN DESCRIPTION - DESCRIPTORS: DESCRIPTORS: STABBING

## 2025-06-13 ASSESSMENT — PAIN DESCRIPTION - ORIENTATION: ORIENTATION: MID

## 2025-06-13 NOTE — CARE PLAN
Problem: Pain - Adult  Goal: Verbalizes/displays adequate comfort level or baseline comfort level  6/13/2025 1807 by Marilin Paz RN  Outcome: Progressing  6/13/2025 1806 by Marilin Paz RN  Outcome: Progressing     Problem: Safety - Adult  Goal: Free from fall injury  6/13/2025 1807 by Marilin Paz RN  Outcome: Progressing  6/13/2025 1806 by Marilin Paz RN  Outcome: Progressing     Problem: Discharge Planning  Goal: Discharge to home or other facility with appropriate resources  6/13/2025 1807 by Marilin Paz RN  Outcome: Progressing  6/13/2025 1806 by Marilin Paz RN  Outcome: Progressing     Problem: Chronic Conditions and Co-morbidities  Goal: Patient's chronic conditions and co-morbidity symptoms are monitored and maintained or improved  6/13/2025 1807 by Marilin Paz RN  Outcome: Progressing  6/13/2025 1806 by Marilin Paz RN  Outcome: Progressing     Problem: Nutrition  Goal: Nutrient intake appropriate for maintaining nutritional needs  6/13/2025 1807 by Marilin Paz RN  Outcome: Progressing  6/13/2025 1806 by Marilin Paz RN  Outcome: Progressing   The patient's goals for the shift include      The clinical goals for the shift include Pt will have decreased abdominal pain

## 2025-06-13 NOTE — PROGRESS NOTES
"Sudha Ervin is a 49 y.o. female on day 2 of admission presenting with Acute pancreatitis, unspecified complication status, unspecified pancreatitis type ([facility]).    Subjective   Patient sleeping, arousable.  Reports abdominal pain and nausea only mildly improved.      Objective     General: Lying in bed without distress.  Cooperative.  Severely obese.  Skin: No rashes or ulcerations.  HEENT: Sclera is white.  Mucous membranes mildly dry.  Neck: Supple.  No JVD.  Cardiac: Regular rate and rhythm, S1/S2 normal.  Lungs: Clear to auscultation bilaterally, no wheezing or crackles, no accessory muscle use at rest.  Abdomen: Soft, severely obese abdomen, tenderness elicited with palpation to mostly epigastric, but also some tenderness trigger with right upper quadrant and left upper quadrant palpation, BS +  Extremities: No cyanosis.  No lower extremity edema.  Neurologic: Alert and oriented x3.  No focal deficits.  Psychiatric: Appropriate mood and behavior.  Currently no agitation.    Last Recorded Vitals  Blood pressure 121/60, pulse 57, temperature 36.3 °C (97.3 °F), resp. rate 15, height 1.651 m (5' 5\"), weight 112 kg (247 lb), SpO2 94%.  On room air.    Intake/Output last 3 Shifts:  I/O last 3 completed shifts:  In: 1572.9 (14 mL/kg) [P.O.:700; I.V.:872.9 (7.8 mL/kg)]  Out: - (0 mL/kg)   Weight: 112 kg     Relevant Results  Scheduled Medications[1]   Continuous Medications[2]   PRN Medications[3]     Results for orders placed or performed during the hospital encounter of 06/11/25 (from the past 24 hours)   Basic Metabolic Panel   Result Value Ref Range    Glucose 84 74 - 99 mg/dL    Sodium 137 136 - 145 mmol/L    Potassium 3.6 3.5 - 5.3 mmol/L    Chloride 103 98 - 107 mmol/L    Bicarbonate 24 21 - 32 mmol/L    Anion Gap 14 10 - 20 mmol/L    Urea Nitrogen 4 (L) 6 - 23 mg/dL    Creatinine 0.75 0.50 - 1.05 mg/dL    eGFR >90 >60 mL/min/1.73m*2    Calcium 8.7 8.6 - 10.6 mg/dL        Hospital Course:  Sudha FLORES" Arcadio is a 49 y.o. female presenting with PMMH of HTN, GERD, IBS, [place], presented today complaining of epigastic abdominal pain radiate to the the back, the pain started after the patient was sick 2 weeks ago, she started to have flu like symptoms two weeks ago when the pain started.  Lipase level normal.  Patient had epigastric abdominal pain with radiation to back.  CT imaging did show evidence of inflammation of the pancreas.  Patient diagnosed with acute pancreatitis.  Denies alcohol usage.  Has had cholecystectomy 10 years ago and imaging currently does not show any obstruction of ducts.  Based on patient's story it is possible etiology of pancreatitis is viral.    Assessment and plan:    Acute pancreatitis  -Continue IV fluids but as LR at 125 ml/hr.  -Monitor BMP.  -Advance diet to low-fat restricted diet  -Continue Protonix 40 mg daily.  IV Zofran every 4 hours as needed for nausea.  -Pain regimen: Scheduled Tylenol 975 mg every 8 hours, IV Toradol 30 mg every 6 hours as needed for moderate pain, oxycodone to 5 mg every 4 hours as needed for severe pain.    Primary hypertension  -Currently holding home chlorthalidone 25 mg daily due to nausea and vomiting.  - Systolic 180s yesterday, some of this could be secondary to pain.  -Started Norvasc 5 mg daily on 6/12.  -Continue IV hydralazine as needed for SBP > 180 or DBP > 100.    History of GERD   History of IBS  -Continue Protonix 40 mg daily.  -Currently holding Linzess.    Hyperlipidemia  - Hold atorvastatin for now.    Disposition: Continue IV fluid hydration, monitor electrolytes, monitor nausea vomiting and pain.    Alexander Salmon MD           [1] acetaminophen, 975 mg, oral, q8h  amLODIPine, 5 mg, oral, Daily  enoxaparin, 40 mg, subcutaneous, q12h KARI  pantoprazole, 40 mg, oral, Daily before breakfast  QUEtiapine, 50 mg, oral, BID     [2] lactated Ringer's, 125 mL/hr, Last Rate: Stopped (06/13/25 0816)  lactated Ringer's, 125 mL/hr     [3] PRN  medications: hydrALAZINE, ketorolac, ondansetron ODT **OR** ondansetron, oxyCODONE

## 2025-06-13 NOTE — CARE PLAN
The patient's goals for the shift include      The clinical goals for the shift include pt will have decreased pain throughout this shift.

## 2025-06-14 LAB
ALBUMIN SERPL BCP-MCNC: 3 G/DL (ref 3.4–5)
ANION GAP SERPL CALC-SCNC: 9 MMOL/L (ref 10–20)
BUN SERPL-MCNC: 4 MG/DL (ref 6–23)
CALCIUM SERPL-MCNC: 8.4 MG/DL (ref 8.6–10.6)
CHLORIDE SERPL-SCNC: 105 MMOL/L (ref 98–107)
CO2 SERPL-SCNC: 28 MMOL/L (ref 21–32)
CREAT SERPL-MCNC: 0.68 MG/DL (ref 0.5–1.05)
EGFRCR SERPLBLD CKD-EPI 2021: >90 ML/MIN/1.73M*2
GLUCOSE SERPL-MCNC: 94 MG/DL (ref 74–99)
PHOSPHATE SERPL-MCNC: 3.1 MG/DL (ref 2.5–4.9)
POTASSIUM SERPL-SCNC: 3.7 MMOL/L (ref 3.5–5.3)
SODIUM SERPL-SCNC: 138 MMOL/L (ref 136–145)

## 2025-06-14 PROCEDURE — 2500000004 HC RX 250 GENERAL PHARMACY W/ HCPCS (ALT 636 FOR OP/ED): Mod: SE

## 2025-06-14 PROCEDURE — 2500000001 HC RX 250 WO HCPCS SELF ADMINISTERED DRUGS (ALT 637 FOR MEDICARE OP): Mod: SE | Performed by: INTERNAL MEDICINE

## 2025-06-14 PROCEDURE — 36415 COLL VENOUS BLD VENIPUNCTURE: CPT | Performed by: INTERNAL MEDICINE

## 2025-06-14 PROCEDURE — 2500000001 HC RX 250 WO HCPCS SELF ADMINISTERED DRUGS (ALT 637 FOR MEDICARE OP): Mod: SE

## 2025-06-14 PROCEDURE — 2500000004 HC RX 250 GENERAL PHARMACY W/ HCPCS (ALT 636 FOR OP/ED): Mod: SE | Performed by: INTERNAL MEDICINE

## 2025-06-14 PROCEDURE — 80069 RENAL FUNCTION PANEL: CPT | Performed by: INTERNAL MEDICINE

## 2025-06-14 PROCEDURE — 99233 SBSQ HOSP IP/OBS HIGH 50: CPT | Performed by: INTERNAL MEDICINE

## 2025-06-14 PROCEDURE — 2500000002 HC RX 250 W HCPCS SELF ADMINISTERED DRUGS (ALT 637 FOR MEDICARE OP, ALT 636 FOR OP/ED): Mod: SE | Performed by: INTERNAL MEDICINE

## 2025-06-14 PROCEDURE — 1210000001 HC SEMI-PRIVATE ROOM DAILY

## 2025-06-14 RX ORDER — AMOXICILLIN 250 MG
1 CAPSULE ORAL 2 TIMES DAILY
Status: DISCONTINUED | OUTPATIENT
Start: 2025-06-14 | End: 2025-06-15 | Stop reason: HOSPADM

## 2025-06-14 RX ORDER — POLYETHYLENE GLYCOL 3350 17 G/17G
17 POWDER, FOR SOLUTION ORAL DAILY PRN
Status: DISCONTINUED | OUTPATIENT
Start: 2025-06-14 | End: 2025-06-15 | Stop reason: HOSPADM

## 2025-06-14 RX ORDER — AMOXICILLIN 250 MG
2 CAPSULE ORAL 2 TIMES DAILY
Status: DISCONTINUED | OUTPATIENT
Start: 2025-06-14 | End: 2025-06-14

## 2025-06-14 RX ORDER — OXYCODONE HYDROCHLORIDE 5 MG/1
5 TABLET ORAL EVERY 6 HOURS PRN
Refills: 0 | Status: DISCONTINUED | OUTPATIENT
Start: 2025-06-14 | End: 2025-06-15 | Stop reason: HOSPADM

## 2025-06-14 RX ORDER — POLYETHYLENE GLYCOL 3350 17 G/17G
17 POWDER, FOR SOLUTION ORAL DAILY
Status: DISCONTINUED | OUTPATIENT
Start: 2025-06-14 | End: 2025-06-14

## 2025-06-14 RX ORDER — QUETIAPINE FUMARATE 25 MG/1
50 TABLET, FILM COATED ORAL DAILY
Status: DISCONTINUED | OUTPATIENT
Start: 2025-06-15 | End: 2025-06-15 | Stop reason: HOSPADM

## 2025-06-14 RX ORDER — QUETIAPINE FUMARATE 25 MG/1
25 TABLET, FILM COATED ORAL EVERY EVENING
Status: DISCONTINUED | OUTPATIENT
Start: 2025-06-14 | End: 2025-06-15 | Stop reason: HOSPADM

## 2025-06-14 RX ORDER — SODIUM CHLORIDE, SODIUM LACTATE, POTASSIUM CHLORIDE, CALCIUM CHLORIDE 600; 310; 30; 20 MG/100ML; MG/100ML; MG/100ML; MG/100ML
125 INJECTION, SOLUTION INTRAVENOUS CONTINUOUS
Status: ACTIVE | OUTPATIENT
Start: 2025-06-14 | End: 2025-06-15

## 2025-06-14 RX ADMIN — QUETIAPINE FUMARATE 50 MG: 25 TABLET ORAL at 01:00

## 2025-06-14 RX ADMIN — SODIUM CHLORIDE, SODIUM LACTATE, POTASSIUM CHLORIDE, AND CALCIUM CHLORIDE 125 ML/HR: .6; .31; .03; .02 INJECTION, SOLUTION INTRAVENOUS at 08:20

## 2025-06-14 RX ADMIN — AMLODIPINE BESYLATE 5 MG: 5 TABLET ORAL at 08:25

## 2025-06-14 RX ADMIN — ENOXAPARIN SODIUM 40 MG: 100 INJECTION SUBCUTANEOUS at 08:25

## 2025-06-14 RX ADMIN — ACETAMINOPHEN 975 MG: 325 TABLET ORAL at 16:53

## 2025-06-14 RX ADMIN — OXYCODONE 5 MG: 5 TABLET ORAL at 06:25

## 2025-06-14 RX ADMIN — ENOXAPARIN SODIUM 40 MG: 100 INJECTION SUBCUTANEOUS at 22:15

## 2025-06-14 RX ADMIN — QUETIAPINE FUMARATE 25 MG: 25 TABLET ORAL at 22:15

## 2025-06-14 RX ADMIN — ACETAMINOPHEN 975 MG: 325 TABLET ORAL at 01:00

## 2025-06-14 RX ADMIN — KETOROLAC TROMETHAMINE 30 MG: 30 INJECTION, SOLUTION INTRAMUSCULAR; INTRAVENOUS at 22:15

## 2025-06-14 RX ADMIN — QUETIAPINE FUMARATE 50 MG: 25 TABLET ORAL at 08:25

## 2025-06-14 RX ADMIN — KETOROLAC TROMETHAMINE 30 MG: 30 INJECTION, SOLUTION INTRAMUSCULAR; INTRAVENOUS at 01:01

## 2025-06-14 RX ADMIN — PANTOPRAZOLE SODIUM 40 MG: 40 TABLET, DELAYED RELEASE ORAL at 06:28

## 2025-06-14 RX ADMIN — ACETAMINOPHEN 975 MG: 325 TABLET ORAL at 08:25

## 2025-06-14 RX ADMIN — ONDANSETRON 4 MG: 4 TABLET, ORALLY DISINTEGRATING ORAL at 12:40

## 2025-06-14 RX ADMIN — SENNOSIDES AND DOCUSATE SODIUM 1 TABLET: 50; 8.6 TABLET ORAL at 22:14

## 2025-06-14 ASSESSMENT — COGNITIVE AND FUNCTIONAL STATUS - GENERAL
PERSONAL GROOMING: A LITTLE
EATING MEALS: A LITTLE
DAILY ACTIVITIY SCORE: 22
MOBILITY SCORE: 24

## 2025-06-14 ASSESSMENT — PAIN DESCRIPTION - LOCATION: LOCATION: ABDOMEN

## 2025-06-14 ASSESSMENT — PAIN SCALES - GENERAL
PAINLEVEL_OUTOF10: 2
PAINLEVEL_OUTOF10: 5 - MODERATE PAIN
PAINLEVEL_OUTOF10: 9

## 2025-06-14 ASSESSMENT — PAIN - FUNCTIONAL ASSESSMENT
PAIN_FUNCTIONAL_ASSESSMENT: 0-10
PAIN_FUNCTIONAL_ASSESSMENT: 0-10

## 2025-06-14 NOTE — CARE PLAN
Problem: Pain - Adult  Goal: Verbalizes/displays adequate comfort level or baseline comfort level  Outcome: Progressing     Problem: Safety - Adult  Goal: Free from fall injury  Outcome: Progressing     Problem: Discharge Planning  Goal: Discharge to home or other facility with appropriate resources  Outcome: Progressing     Problem: Chronic Conditions and Co-morbidities  Goal: Patient's chronic conditions and co-morbidity symptoms are monitored and maintained or improved  Outcome: Progressing     Problem: Nutrition  Goal: Nutrient intake appropriate for maintaining nutritional needs  Outcome: Progressing   The patient's goals for the shift include      The clinical goals for the shift include  pain control

## 2025-06-14 NOTE — PROGRESS NOTES
"Sudha Ervin is a 49 y.o. female on day 3 of admission presenting with Acute pancreatitis, unspecified complication status, unspecified pancreatitis type ([facility]).    Subjective   Patient reports that Seroquel normally makes her sleepy, this has been like that since outpatient.  She states that she normally takes her Seroquel 9 AM and 6 PM.  She feels that she is getting her Seroquel to later in the evening.  Still having abdominal pain.  Only slightly improved from admission.  Has not had bowel movement for 5 days.  Tolerating diet.      Objective     General: Lying in bed without distress.  Cooperative.  Severely obese.  Skin: No rashes or ulcerations.  HEENT: Sclera is white.  Mucous membranes mildly dry.  Neck: Supple.  No JVD.  Cardiac: Regular rate and rhythm, S1/S2 normal.  Lungs: Clear to auscultation bilaterally, no wheezing or crackles, no accessory muscle use at rest.  Abdomen: Soft, severely obese abdomen, tenderness elicited with palpation to mostly epigastric, but also some tenderness trigger with right upper quadrant and left upper quadrant palpation, BS +  Extremities: No cyanosis.  No lower extremity edema.  Neurologic: Appears mildly sleepy, but easily arousable and becomes alert and oriented x3.  No focal deficits.  Psychiatric: Appropriate mood and behavior.  Currently no agitation.    Last Recorded Vitals  Blood pressure 107/71, pulse 52, temperature 36.1 °C (97 °F), resp. rate 17, height 1.651 m (5' 5\"), weight 112 kg (247 lb), SpO2 96%.  On room air.    Intake/Output last 3 Shifts:  I/O last 3 completed shifts:  In: 2127.1 (19 mL/kg) [I.V.:2127.1 (19 mL/kg)]  Out: - (0 mL/kg)   Weight: 112 kg     Relevant Results  Scheduled Medications[1]   Continuous Medications[2]   PRN Medications[3]     Results for orders placed or performed during the hospital encounter of 06/11/25 (from the past 24 hours)   Renal Function Panel   Result Value Ref Range    Glucose 94 74 - 99 mg/dL    Sodium 138 136 " - 145 mmol/L    Potassium 3.7 3.5 - 5.3 mmol/L    Chloride 105 98 - 107 mmol/L    Bicarbonate 28 21 - 32 mmol/L    Anion Gap 9 (L) 10 - 20 mmol/L    Urea Nitrogen 4 (L) 6 - 23 mg/dL    Creatinine 0.68 0.50 - 1.05 mg/dL    eGFR >90 >60 mL/min/1.73m*2    Calcium 8.4 (L) 8.6 - 10.6 mg/dL    Phosphorus 3.1 2.5 - 4.9 mg/dL    Albumin 3.0 (L) 3.4 - 5.0 g/dL        Hospital Course:  Sudha Ervin is a 49 y.o. female presenting with PMMH of HTN, GERD, IBS, [place], presented today complaining of epigastic abdominal pain radiate to the the back, the pain started after the patient was sick 2 weeks ago, she started to have flu like symptoms two weeks ago when the pain started.  Lipase level normal.  Patient had epigastric abdominal pain with radiation to back.  CT imaging did show evidence of inflammation of the pancreas.  Patient diagnosed with acute pancreatitis.  Denies alcohol usage.  Has had cholecystectomy 10 years ago and imaging currently does not show any obstruction of ducts.  Based on patient's story it is possible etiology of pancreatitis is viral.    Assessment and plan:    Acute pancreatitis  -Continue IV fluids but as LR at 125 ml/hr.  -Monitor BMP.  -Advance diet to low-fat restricted diet  -Continue Protonix 40 mg daily.  IV Zofran every 4 hours as needed for nausea.  -Pain regimen: Scheduled Tylenol 975 mg every 8 hours, IV Toradol 30 mg every 6 hours as needed for moderate pain, decrease oxycodone to 5 mg every 6 hours as needed for severe pain.    Primary hypertension  -Currently holding home chlorthalidone 25 mg daily due to nausea and vomiting.  - Systolic 180s yesterday, some of this could be secondary to pain.  -Started Norvasc 5 mg daily on 6/12.  -Continue IV hydralazine as needed for SBP > 180 or DBP > 100.    History of GERD   History of IBS  Constipation  -Continue Protonix 40 mg daily.  -Resume home Linzess 145 mcg daily.  Start MiraLAX 17 g daily, Senokot S2 tablets twice daily.  - Last known  bowel movement 5 days ago.  Is not clear if constipation may also be contributed to current pain patient is still having.    Hyperlipidemia  - Hold atorvastatin for now.    Disposition: Continue IV fluid hydration, monitor electrolytes, appears to be tolerating diet, restart bowel regimen and monitor.    Alexander Salmon MD           [1] acetaminophen, 975 mg, oral, q8h  amLODIPine, 5 mg, oral, Daily  enoxaparin, 40 mg, subcutaneous, q12h KARI  pantoprazole, 40 mg, oral, Daily before breakfast  polyethylene glycol, 17 g, oral, Daily  QUEtiapine, 25 mg, oral, q PM  [START ON 6/15/2025] QUEtiapine, 50 mg, oral, Daily  sennosides-docusate sodium, 2 tablet, oral, BID     [2] lactated Ringer's, 125 mL/hr, Last Rate: 125 mL/hr (06/14/25 1249)     [3] PRN medications: hydrALAZINE, ketorolac, ondansetron ODT **OR** ondansetron, oxyCODONE

## 2025-06-15 VITALS
OXYGEN SATURATION: 98 % | HEART RATE: 78 BPM | HEIGHT: 65 IN | SYSTOLIC BLOOD PRESSURE: 179 MMHG | DIASTOLIC BLOOD PRESSURE: 81 MMHG | RESPIRATION RATE: 18 BRPM | WEIGHT: 247 LBS | TEMPERATURE: 97 F | BODY MASS INDEX: 41.15 KG/M2

## 2025-06-15 PROBLEM — K85.90 ACUTE PANCREATITIS, UNSPECIFIED COMPLICATION STATUS, UNSPECIFIED PANCREATITIS TYPE (HHS-HCC): Status: RESOLVED | Noted: 2025-06-11 | Resolved: 2025-06-15

## 2025-06-15 LAB
ANION GAP SERPL CALC-SCNC: 12 MMOL/L (ref 10–20)
BUN SERPL-MCNC: 4 MG/DL (ref 6–23)
CALCIUM SERPL-MCNC: 8.9 MG/DL (ref 8.6–10.6)
CHLORIDE SERPL-SCNC: 103 MMOL/L (ref 98–107)
CO2 SERPL-SCNC: 27 MMOL/L (ref 21–32)
CREAT SERPL-MCNC: 0.69 MG/DL (ref 0.5–1.05)
EGFRCR SERPLBLD CKD-EPI 2021: >90 ML/MIN/1.73M*2
GLUCOSE SERPL-MCNC: 93 MG/DL (ref 74–99)
POTASSIUM SERPL-SCNC: 3.6 MMOL/L (ref 3.5–5.3)
SODIUM SERPL-SCNC: 138 MMOL/L (ref 136–145)

## 2025-06-15 PROCEDURE — 2500000001 HC RX 250 WO HCPCS SELF ADMINISTERED DRUGS (ALT 637 FOR MEDICARE OP): Mod: SE

## 2025-06-15 PROCEDURE — 80048 BASIC METABOLIC PNL TOTAL CA: CPT | Performed by: INTERNAL MEDICINE

## 2025-06-15 PROCEDURE — 2500000004 HC RX 250 GENERAL PHARMACY W/ HCPCS (ALT 636 FOR OP/ED): Mod: SE

## 2025-06-15 PROCEDURE — 36415 COLL VENOUS BLD VENIPUNCTURE: CPT | Performed by: INTERNAL MEDICINE

## 2025-06-15 PROCEDURE — 99239 HOSP IP/OBS DSCHRG MGMT >30: CPT | Performed by: INTERNAL MEDICINE

## 2025-06-15 PROCEDURE — 2500000001 HC RX 250 WO HCPCS SELF ADMINISTERED DRUGS (ALT 637 FOR MEDICARE OP): Mod: SE | Performed by: INTERNAL MEDICINE

## 2025-06-15 PROCEDURE — 2500000002 HC RX 250 W HCPCS SELF ADMINISTERED DRUGS (ALT 637 FOR MEDICARE OP, ALT 636 FOR OP/ED): Mod: SE | Performed by: INTERNAL MEDICINE

## 2025-06-15 RX ORDER — OXYCODONE HYDROCHLORIDE 5 MG/1
5 TABLET ORAL EVERY 6 HOURS PRN
Qty: 12 TABLET | Refills: 0 | Status: SHIPPED | OUTPATIENT
Start: 2025-06-15

## 2025-06-15 RX ORDER — ACETAMINOPHEN 325 MG/1
975 TABLET ORAL EVERY 8 HOURS PRN
Qty: 270 TABLET | Refills: 2 | Status: SHIPPED | OUTPATIENT
Start: 2025-06-15

## 2025-06-15 RX ORDER — ONDANSETRON 4 MG/1
4 TABLET, ORALLY DISINTEGRATING ORAL EVERY 6 HOURS PRN
Qty: 30 TABLET | Refills: 2 | Status: SHIPPED | OUTPATIENT
Start: 2025-06-15

## 2025-06-15 RX ORDER — AMOXICILLIN 250 MG
1 CAPSULE ORAL 2 TIMES DAILY
Qty: 60 TABLET | Refills: 2 | Status: SHIPPED | OUTPATIENT
Start: 2025-06-15

## 2025-06-15 RX ORDER — AMLODIPINE BESYLATE 5 MG/1
5 TABLET ORAL DAILY
Qty: 30 TABLET | Refills: 2 | Status: SHIPPED | OUTPATIENT
Start: 2025-06-15 | End: 2025-06-15 | Stop reason: HOSPADM

## 2025-06-15 RX ADMIN — ACETAMINOPHEN 975 MG: 325 TABLET ORAL at 05:42

## 2025-06-15 RX ADMIN — SENNOSIDES AND DOCUSATE SODIUM 1 TABLET: 50; 8.6 TABLET ORAL at 08:36

## 2025-06-15 RX ADMIN — PANTOPRAZOLE SODIUM 40 MG: 40 TABLET, DELAYED RELEASE ORAL at 05:43

## 2025-06-15 RX ADMIN — AMLODIPINE BESYLATE 5 MG: 5 TABLET ORAL at 08:36

## 2025-06-15 RX ADMIN — LINACLOTIDE 145 MCG: 145 CAPSULE, GELATIN COATED ORAL at 08:35

## 2025-06-15 RX ADMIN — QUETIAPINE FUMARATE 50 MG: 25 TABLET ORAL at 08:35

## 2025-06-15 RX ADMIN — ONDANSETRON 4 MG: 4 TABLET, ORALLY DISINTEGRATING ORAL at 08:36

## 2025-06-15 ASSESSMENT — PAIN SCALES - GENERAL: PAINLEVEL_OUTOF10: 2

## 2025-06-15 NOTE — DISCHARGE SUMMARY
Discharge Diagnosis  Acute pancreatitis, etiology not clear but suspect viral  Constipation      Issues Requiring Follow-Up  Schedule request has been made for patient to establish with primary care    Discharge Meds  Medication List      START taking these medications     acetaminophen 325 mg tablet; Commonly known as: Tylenol; Take 3 tablets   (975 mg) by mouth every 8 hours if needed (Any level of pain or fever).;   Replaces: acetaminophen 650 mg ER tablet   ondansetron ODT 4 mg disintegrating tablet; Commonly known as:   Zofran-ODT; Dissolve 1 tablet (4 mg) in the mouth every 6 hours if needed   for nausea or vomiting.   oxyCODONE 5 mg immediate release tablet; Commonly known as: Roxicodone;   Take 1 tablet (5 mg) by mouth every 6 hours if needed for severe pain (7 -   10).   sennosides-docusate sodium 8.6-50 mg tablet; Commonly known as:   Leslie-Colace; Take 1 tablet by mouth 2 times a day.     CONTINUE taking these medications     chlorthalidone 25 mg tablet; Commonly known as: Hygroton   linaCLOtide 145 mcg capsule; Commonly known as: Linzess   pantoprazole 40 mg EC tablet; Commonly known as: ProtoNix   QUEtiapine 50 mg tablet; Commonly known as: SEROquel     STOP taking these medications     acetaminophen 650 mg ER tablet; Commonly known as: Tylenol 8 HOUR;   Replaced by: acetaminophen 325 mg tablet   atorvastatin 10 mg tablet; Commonly known as: Lipitor   estradiol 0.01 % (0.1 mg/gram) vaginal cream; Commonly known as: Estrace   hydroCHLOROthiazide 25 mg tablet; Commonly known as: HYDRODiuril   varenicline tartrate 1 mg tablet; Commonly known as: Chantix    Test Results Pending At Discharge  None.    Hospital Course  Sudha Ervin is a 49 y.o. female presenting with PM of HTN, GERD, IBS, [place], presented today complaining of epigastic abdominal pain radiate to the the back, the pain started after the patient was sick 2 weeks ago, she started to have flu like symptoms two weeks ago when the pain started.   Lipase level normal.  Patient had epigastric abdominal pain with radiation to back.  CT imaging did show evidence of inflammation of the pancreas.  Patient diagnosed with acute pancreatitis.  Denies alcohol usage.  Has had cholecystectomy 10 years ago and imaging currently does not show any obstruction of ducts.  Based on patient's story it is possible etiology of pancreatitis is viral.  Patient started on aggressive IV fluids, pain control.  Patient initially started on clear liquids then advance diet.  Patient showed some mild improvement but did not fully improve.  Constipation which patient has chronically likely also played a part into her symptoms.  After patient had a bowel movement symptoms significantly improved too.  Patient tolerating diet for several days, has had a bowel movement, symptoms improved, able to ambulate without issue, patient stable for discharge.     Assessment and plan:     Acute pancreatitis  -Has been tolerating diet.  Recommend low-fat diet.  -Continue Protonix 40 mg daily.  Discharged on oral Zofran as needed.  -Pain regimen: Will discharge on Tylenol as needed, 3 days worth of oxycodone as needed for severe pain only.     Primary hypertension  -Resume home chlorthalidone 25 mg daily due to nausea and vomiting.  -While hospitalized we will use Norvasc instead of her chlorthalidone to help with her blood pressure.  Some of her elevated blood pressure during mission also related to pain.  On discharge we will plan for her to resume her home blood pressure medication and nothing new.  Patient should establish with primary care provider.     History of GERD   History of IBS  Constipation  -Continue Protonix 40 mg daily.  -Resume home Linzess 145 mcg daily.    - Constipation resolved, patient had multiple bowel movements on 6/14.  Will also discharge home on Senokot S.     Hyperlipidemia  - Per patient report she has not been taking atorvastatin at home.  She reports not able to tolerate  medication.  Advised patient to follow-up with her primary care provider to discuss other treatments.    Time spent caring for patient and coordinating discharge total 35 minutes.    Pertinent Physical Exam At Time of Discharge  General: Lying in bed without distress.  Cooperative.  Severely obese.  Skin: No rashes or ulcerations.  HEENT: Sclera is white.  Mucous membranes mildly dry.  Neck: Supple.  No JVD.  Cardiac: Regular rate and rhythm, S1/S2 normal.  Lungs: Clear to auscultation bilaterally, no wheezing or crackles, no accessory muscle use at rest.  Abdomen: Soft, severely obese abdomen, tenderness elicited with palpation to mostly epigastric, but also some tenderness trigger with right upper quadrant and left upper quadrant palpation has improved, BS +  Extremities: No cyanosis.  No lower extremity edema.  Neurologic: Appears mildly sleepy, but easily arousable and becomes alert and oriented x3.  No focal deficits.  Psychiatric: Appropriate mood and behavior.  Currently no agitation.    Outpatient Follow-Up  No future appointments.      Alexander Salmon MD

## 2025-06-15 NOTE — NURSING NOTE
Pt discharged home via private car. All discharge instructions reviewed and no further questions. PIV removed. Pt packed and took all belongings and left in stable condition.

## 2025-06-15 NOTE — CARE PLAN
Problem: Pain - Adult  Goal: Verbalizes/displays adequate comfort level or baseline comfort level  Outcome: Met     Problem: Safety - Adult  Goal: Free from fall injury  Outcome: Met     Problem: Discharge Planning  Goal: Discharge to home or other facility with appropriate resources  Outcome: Met     Problem: Chronic Conditions and Co-morbidities  Goal: Patient's chronic conditions and co-morbidity symptoms are monitored and maintained or improved  Outcome: Met     Problem: Nutrition  Goal: Nutrient intake appropriate for maintaining nutritional needs  Outcome: Met   The patient's goals for the shift include      The clinical goals for the shift include Pt will remain HDS and will be discharged to home in am

## 2025-06-15 NOTE — CARE PLAN
Problem: Pain - Adult  Goal: Verbalizes/displays adequate comfort level or baseline comfort level  Outcome: Progressing     Problem: Discharge Planning  Goal: Discharge to home or other facility with appropriate resources  Outcome: Progressing     Problem: Chronic Conditions and Co-morbidities  Goal: Patient's chronic conditions and co-morbidity symptoms are monitored and maintained or improved  Outcome: Progressing     Problem: Nutrition  Goal: Nutrient intake appropriate for maintaining nutritional needs  Outcome: Progressing   The patient's goals for the shift include      The clinical goals for the shift include manage pain

## 2025-08-19 ENCOUNTER — APPOINTMENT (OUTPATIENT)
Dept: RADIOLOGY | Facility: HOSPITAL | Age: 50
End: 2025-08-19
Payer: COMMERCIAL

## 2025-08-19 VITALS — BODY MASS INDEX: 39.32 KG/M2 | HEIGHT: 65 IN | WEIGHT: 236 LBS

## 2025-08-19 DIAGNOSIS — Z12.31 BREAST CANCER SCREENING BY MAMMOGRAM: ICD-10-CM

## 2025-08-19 PROCEDURE — 77063 BREAST TOMOSYNTHESIS BI: CPT | Performed by: RADIOLOGY

## 2025-08-19 PROCEDURE — 77063 BREAST TOMOSYNTHESIS BI: CPT

## 2025-08-19 PROCEDURE — 77067 SCR MAMMO BI INCL CAD: CPT | Performed by: RADIOLOGY

## 2025-08-22 ENCOUNTER — OFFICE VISIT (OUTPATIENT)
Dept: OBSTETRICS AND GYNECOLOGY | Facility: CLINIC | Age: 50
End: 2025-08-22
Payer: COMMERCIAL

## 2025-08-22 VITALS
WEIGHT: 233.3 LBS | DIASTOLIC BLOOD PRESSURE: 84 MMHG | BODY MASS INDEX: 38.82 KG/M2 | SYSTOLIC BLOOD PRESSURE: 151 MMHG | HEART RATE: 81 BPM

## 2025-08-22 DIAGNOSIS — N95.1 VASOMOTOR SYMPTOMS DUE TO MENOPAUSE: ICD-10-CM

## 2025-08-22 DIAGNOSIS — N76.0 ACUTE VAGINITIS: Primary | ICD-10-CM

## 2025-08-22 DIAGNOSIS — Z11.3 SCREEN FOR STD (SEXUALLY TRANSMITTED DISEASE): ICD-10-CM

## 2025-08-22 DIAGNOSIS — B37.31 YEAST VAGINITIS: ICD-10-CM

## 2025-08-22 PROCEDURE — 99213 OFFICE O/P EST LOW 20 MIN: CPT | Performed by: ADVANCED PRACTICE MIDWIFE

## 2025-08-22 PROCEDURE — 99214 OFFICE O/P EST MOD 30 MIN: CPT | Performed by: ADVANCED PRACTICE MIDWIFE

## 2025-08-22 RX ORDER — TERCONAZOLE 80 MG/1
80 SUPPOSITORY VAGINAL NIGHTLY
Qty: 7 SUPPOSITORY | Refills: 0 | Status: SHIPPED | OUTPATIENT
Start: 2025-08-22 | End: 2025-08-29

## 2025-08-22 RX ORDER — ESTRADIOL 0.03 MG/D
1 PATCH TRANSDERMAL WEEKLY
Qty: 4 PATCH | Refills: 1 | Status: SHIPPED | OUTPATIENT
Start: 2025-08-22 | End: 2026-08-22

## 2025-08-22 ASSESSMENT — ENCOUNTER SYMPTOMS
HEMATOLOGIC/LYMPHATIC NEGATIVE: 0
ENDOCRINE NEGATIVE: 0
GASTROINTESTINAL NEGATIVE: 0
CONSTITUTIONAL NEGATIVE: 0
ALLERGIC/IMMUNOLOGIC NEGATIVE: 0
PSYCHIATRIC NEGATIVE: 0
EYES NEGATIVE: 0
RESPIRATORY NEGATIVE: 0
NEUROLOGICAL NEGATIVE: 0
CARDIOVASCULAR NEGATIVE: 0
MUSCULOSKELETAL NEGATIVE: 0

## 2025-08-22 ASSESSMENT — PAIN SCALES - GENERAL: PAINLEVEL_OUTOF10: 5

## 2025-08-23 LAB
BV SCORE VAG QL: ABNORMAL
C TRACH RRNA SPEC QL NAA+PROBE: NOT DETECTED
N GONORRHOEA RRNA SPEC QL NAA+PROBE: NOT DETECTED
QUEST GC CT AMPLIFIED (ALWAYS MESSAGE): NORMAL
T VAGINALIS RRNA SPEC QL NAA+PROBE: NOT DETECTED

## 2025-08-25 DIAGNOSIS — N76.0 ACUTE VAGINITIS: ICD-10-CM

## 2025-08-25 DIAGNOSIS — B37.31 YEAST VAGINITIS: ICD-10-CM

## 2025-08-25 DIAGNOSIS — N95.1 VASOMOTOR SYMPTOMS DUE TO MENOPAUSE: ICD-10-CM

## 2025-08-25 PROCEDURE — RXMED WILLOW AMBULATORY MEDICATION CHARGE

## 2025-08-25 RX ORDER — ESTRADIOL 0.03 MG/D
1 PATCH TRANSDERMAL WEEKLY
Qty: 4 PATCH | Refills: 1 | Status: SHIPPED | OUTPATIENT
Start: 2025-08-25 | End: 2026-08-25

## 2025-08-25 RX ORDER — TERCONAZOLE 80 MG/1
80 SUPPOSITORY VAGINAL NIGHTLY
Qty: 3 SUPPOSITORY | Refills: 0 | Status: SHIPPED | OUTPATIENT
Start: 2025-08-25 | End: 2025-08-30

## 2025-08-27 ENCOUNTER — PHARMACY VISIT (OUTPATIENT)
Dept: PHARMACY | Facility: CLINIC | Age: 50
End: 2025-08-27
Payer: COMMERCIAL

## 2025-09-26 ENCOUNTER — APPOINTMENT (OUTPATIENT)
Dept: OBSTETRICS AND GYNECOLOGY | Facility: CLINIC | Age: 50
End: 2025-09-26
Payer: COMMERCIAL